# Patient Record
Sex: FEMALE | Race: WHITE | NOT HISPANIC OR LATINO | Employment: FULL TIME | URBAN - METROPOLITAN AREA
[De-identification: names, ages, dates, MRNs, and addresses within clinical notes are randomized per-mention and may not be internally consistent; named-entity substitution may affect disease eponyms.]

---

## 2017-11-09 ENCOUNTER — APPOINTMENT (OUTPATIENT)
Dept: RADIOLOGY | Age: 52
End: 2017-11-09
Payer: COMMERCIAL

## 2017-11-09 ENCOUNTER — TRANSCRIBE ORDERS (OUTPATIENT)
Dept: ADMINISTRATIVE | Age: 52
End: 2017-11-09

## 2017-11-09 DIAGNOSIS — R52 PAIN: Primary | ICD-10-CM

## 2017-11-09 DIAGNOSIS — R52 PAIN: ICD-10-CM

## 2017-11-09 PROCEDURE — 73130 X-RAY EXAM OF HAND: CPT

## 2019-04-04 ENCOUNTER — ANNUAL EXAM (OUTPATIENT)
Dept: OBGYN CLINIC | Facility: CLINIC | Age: 54
End: 2019-04-04
Payer: COMMERCIAL

## 2019-04-04 VITALS
DIASTOLIC BLOOD PRESSURE: 80 MMHG | HEIGHT: 62 IN | WEIGHT: 164 LBS | SYSTOLIC BLOOD PRESSURE: 122 MMHG | BODY MASS INDEX: 30.18 KG/M2

## 2019-04-04 DIAGNOSIS — Z12.39 BREAST SCREENING, UNSPECIFIED: ICD-10-CM

## 2019-04-04 DIAGNOSIS — Z01.419 ROUTINE GYNECOLOGICAL EXAMINATION: ICD-10-CM

## 2019-04-04 DIAGNOSIS — Z01.419 ENCOUNTER FOR WELL WOMAN EXAM: Primary | ICD-10-CM

## 2019-04-04 DIAGNOSIS — Z11.51 SPECIAL SCREENING EXAMINATION FOR HUMAN PAPILLOMAVIRUS (HPV): ICD-10-CM

## 2019-04-04 DIAGNOSIS — N95.1 PERIMENOPAUSAL: ICD-10-CM

## 2019-04-04 PROCEDURE — S0610 ANNUAL GYNECOLOGICAL EXAMINA: HCPCS | Performed by: PHYSICIAN ASSISTANT

## 2019-04-04 RX ORDER — THIAMINE MONONITRATE (VIT B1) 100 MG
100 TABLET ORAL DAILY
COMMUNITY

## 2019-04-04 RX ORDER — RIBOFLAVIN (VITAMIN B2) 100 MG
100 TABLET ORAL DAILY
COMMUNITY

## 2019-04-04 RX ORDER — AMOXICILLIN 875 MG/1
875 TABLET, COATED ORAL 2 TIMES DAILY
COMMUNITY
End: 2019-10-15 | Stop reason: ALTCHOICE

## 2019-04-16 LAB
HPV HR 12 DNA CVX QL NAA+PROBE: NOT DETECTED
HPV16 DNA SPEC QL NAA+PROBE: NOT DETECTED
HPV18 DNA SPEC QL NAA+PROBE: NOT DETECTED
THIN PREP CVX: ABNORMAL

## 2019-10-15 ENCOUNTER — OFFICE VISIT (OUTPATIENT)
Dept: OBGYN CLINIC | Facility: CLINIC | Age: 54
End: 2019-10-15
Payer: COMMERCIAL

## 2019-10-15 VITALS — WEIGHT: 166 LBS | HEIGHT: 61 IN | BODY MASS INDEX: 31.34 KG/M2

## 2019-10-15 DIAGNOSIS — N95.1 VASOMOTOR SYMPTOMS DUE TO MENOPAUSE: ICD-10-CM

## 2019-10-15 DIAGNOSIS — R87.610 ATYPICAL SQUAMOUS CELL CHANGES OF UNDETERMINED SIGNIFICANCE (ASCUS) ON CERVICAL CYTOLOGY WITH NEGATIVE HIGH RISK HUMAN PAPILLOMA VIRUS (HPV) TEST RESULT: Primary | ICD-10-CM

## 2019-10-15 PROCEDURE — 88175 CYTOPATH C/V AUTO FLUID REDO: CPT | Performed by: PHYSICIAN ASSISTANT

## 2019-10-15 PROCEDURE — 99213 OFFICE O/P EST LOW 20 MIN: CPT | Performed by: PHYSICIAN ASSISTANT

## 2019-10-15 RX ORDER — PAROXETINE 7.5 MG/1
7.5 CAPSULE ORAL DAILY
Qty: 30 CAPSULE | Refills: 5 | Status: SHIPPED | OUTPATIENT
Start: 2019-10-15

## 2019-10-15 NOTE — PROGRESS NOTES
The patient is here for a 6 month pap smear  The patient is having hot flashes and sweating  She is also having mood swings  The patient has had these symptoms over a year ago  The symptoms are much worse now

## 2019-10-15 NOTE — PROGRESS NOTES
Assessment/Plan:    No problem-specific Assessment & Plan notes found for this encounter  Diagnoses and all orders for this visit:    Atypical squamous cell changes of undetermined significance (ASCUS) on cervical cytology with negative high risk human papilloma virus (HPV) test result  -     Liquid-based pap, diagnostic    Vasomotor symptoms due to menopause  -     PARoxetine Mesylate 7 5 MG CAPS; Take 1 capsule (7 5 mg total) by mouth daily    Other orders  -     Nutritional Supplements (WOMENS FORMULA MENOPAUSE PO); Take 1 tablet by mouth daily  -     Omega-3 Fatty Acids (FISH OIL PO); Take 1 tablet by mouth daily  -     FLAXSEED, LINSEED, PO; Take by mouth          Subjective:      Patient ID: Graciela Cabrales is a 48 y o  female  Pt here for repeat pap smear  Ascus -hpv in April 2019  C/o vasomotor sxs getting worse--supplements not helping  rx brisdelle today  Repeat pap done      The following portions of the patient's history were reviewed and updated as appropriate: allergies, current medications, past family history, past medical history, past social history, past surgical history and problem list     Review of Systems   Constitutional: Negative for chills, fever and unexpected weight change  Gastrointestinal: Negative for abdominal pain, blood in stool, constipation and diarrhea  Genitourinary: Negative  Objective:      Ht 5' 1 02" (1 55 m)   Wt 75 3 kg (166 lb)   LMP 06/30/2018 (Within Weeks)   BMI 31 34 kg/m²          Physical Exam   Constitutional: She appears well-developed and well-nourished  Genitourinary: Vagina normal  Pelvic exam was performed with patient supine  There is no rash or lesion on the right labia  There is no rash or lesion on the left labia  Cervix exhibits no motion tenderness, no discharge and no friability  Lymphadenopathy: No inguinal adenopathy noted on the right or left side  Nursing note and vitals reviewed

## 2019-10-20 LAB
LAB AP GYN PRIMARY INTERPRETATION: NORMAL
Lab: NORMAL

## 2019-10-21 ENCOUNTER — TELEPHONE (OUTPATIENT)
Dept: OBGYN CLINIC | Facility: CLINIC | Age: 54
End: 2019-10-21

## 2019-10-21 NOTE — TELEPHONE ENCOUNTER
[facility] states Paroxetine 7 5mgs is not covered under patients plan  Did you want to prescribe anything else?   2016 was on Zanax 0 5 q 6hrs prn by PCP

## 2020-04-07 ENCOUNTER — ANNUAL EXAM (OUTPATIENT)
Dept: OBGYN CLINIC | Facility: CLINIC | Age: 55
End: 2020-04-07
Payer: COMMERCIAL

## 2020-04-07 VITALS
DIASTOLIC BLOOD PRESSURE: 70 MMHG | HEIGHT: 61 IN | WEIGHT: 174 LBS | BODY MASS INDEX: 32.85 KG/M2 | SYSTOLIC BLOOD PRESSURE: 130 MMHG

## 2020-04-07 DIAGNOSIS — Z12.39 ENCOUNTER FOR SCREENING BREAST EXAMINATION: ICD-10-CM

## 2020-04-07 DIAGNOSIS — Z01.419 ENCOUNTER FOR WELL WOMAN EXAM: Primary | ICD-10-CM

## 2020-04-07 DIAGNOSIS — Z12.31 ENCOUNTER FOR SCREENING MAMMOGRAM FOR BREAST CANCER: ICD-10-CM

## 2020-04-07 DIAGNOSIS — R87.610 ATYPICAL SQUAMOUS CELL CHANGES OF UNDETERMINED SIGNIFICANCE (ASCUS) ON CERVICAL CYTOLOGY WITH NEGATIVE HIGH RISK HUMAN PAPILLOMA VIRUS (HPV) TEST RESULT: ICD-10-CM

## 2020-04-07 PROCEDURE — 88175 CYTOPATH C/V AUTO FLUID REDO: CPT | Performed by: PHYSICIAN ASSISTANT

## 2020-04-07 PROCEDURE — S0612 ANNUAL GYNECOLOGICAL EXAMINA: HCPCS | Performed by: PHYSICIAN ASSISTANT

## 2020-04-07 RX ORDER — PAROXETINE 10 MG/1
10 TABLET, FILM COATED ORAL DAILY
COMMUNITY
Start: 2020-03-12 | End: 2020-06-08 | Stop reason: SDUPTHER

## 2020-04-09 LAB
LAB AP GYN PRIMARY INTERPRETATION: NORMAL
Lab: NORMAL

## 2020-04-26 ENCOUNTER — AMB VIDEO VISIT (OUTPATIENT)
Dept: OTHER | Facility: HOSPITAL | Age: 55
End: 2020-04-26
Payer: COMMERCIAL

## 2020-04-26 PROCEDURE — 99201 PR OFFICE OUTPATIENT NEW 10 MINUTES: CPT | Performed by: FAMILY MEDICINE

## 2020-06-08 DIAGNOSIS — N95.1 VASOMOTOR SYMPTOMS DUE TO MENOPAUSE: Primary | ICD-10-CM

## 2020-06-08 RX ORDER — PAROXETINE 10 MG/1
10 TABLET, FILM COATED ORAL DAILY
Qty: 90 TABLET | Refills: 1 | Status: SHIPPED | OUTPATIENT
Start: 2020-06-08

## 2023-08-05 ENCOUNTER — HOSPITAL ENCOUNTER (EMERGENCY)
Facility: HOSPITAL | Age: 58
Discharge: HOME | End: 2023-08-05
Attending: EMERGENCY MEDICINE
Payer: COMMERCIAL

## 2023-08-05 VITALS
DIASTOLIC BLOOD PRESSURE: 72 MMHG | SYSTOLIC BLOOD PRESSURE: 153 MMHG | OXYGEN SATURATION: 94 % | RESPIRATION RATE: 18 BRPM | HEART RATE: 63 BPM | TEMPERATURE: 98 F
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
## 2023-08-05 PROCEDURE — 96375 TX/PRO/DX INJ NEW DRUG ADDON: CPT

## 2023-08-05 PROCEDURE — 99284 EMERGENCY DEPT VISIT MOD MDM: CPT | Performed by: PHYSICIAN ASSISTANT

## 2023-08-05 PROCEDURE — 96374 THER/PROPH/DIAG INJ IV PUSH: CPT

## 2023-08-05 PROCEDURE — 99281 EMR DPT VST MAYX REQ PHY/QHP: CPT

## 2023-08-05 PROCEDURE — 96361 HYDRATE IV INFUSION ADD-ON: CPT

## 2023-08-05 RX ORDER — KETOROLAC TROMETHAMINE 30 MG/ML
15 INJECTION, SOLUTION INTRAMUSCULAR; INTRAVENOUS ONCE
Status: COMPLETED | OUTPATIENT
Start: 2023-08-05 | End: 2023-08-05

## 2023-08-05 RX ORDER — FAMOTIDINE 10 MG/ML
20 INJECTION, SOLUTION INTRAVENOUS ONCE
Status: COMPLETED | OUTPATIENT
Start: 2023-08-05 | End: 2023-08-05

## 2023-08-05 RX ORDER — DIPHENHYDRAMINE HYDROCHLORIDE 50 MG/ML
25 INJECTION INTRAMUSCULAR; INTRAVENOUS ONCE
Status: COMPLETED | OUTPATIENT
Start: 2023-08-05 | End: 2023-08-05

## 2023-08-05 RX ORDER — METHYLPREDNISOLONE SODIUM SUCCINATE 125 MG/2ML
80 INJECTION, POWDER, LYOPHILIZED, FOR SOLUTION INTRAMUSCULAR; INTRAVENOUS ONCE
Status: COMPLETED | OUTPATIENT
Start: 2023-08-05 | End: 2023-08-05

## 2023-08-05 RX ORDER — LIDOCAINE HYDROCHLORIDE 20 MG/ML
JELLY TOPICAL ONCE
Status: COMPLETED | OUTPATIENT
Start: 2023-08-05 | End: 2023-08-05

## 2023-08-05 RX ADMIN — FAMOTIDINE 20 MG: 10 INJECTION INTRAVENOUS at 01:56

## 2023-08-05 RX ADMIN — LIDOCAINE HYDROCHLORIDE 1 APPLICATION: 20 JELLY TOPICAL at 02:01

## 2023-08-05 RX ADMIN — Medication 2.5 MG: at 03:26

## 2023-08-05 RX ADMIN — SODIUM CHLORIDE 1000 ML: 0.9 INJECTION, SOLUTION INTRAVENOUS at 01:55

## 2023-08-05 RX ADMIN — DIPHENHYDRAMINE HYDROCHLORIDE 25 MG: 50 INJECTION INTRAMUSCULAR; INTRAVENOUS at 02:01

## 2023-08-05 RX ADMIN — KETOROLAC TROMETHAMINE 15 MG: 30 INJECTION, SOLUTION INTRAMUSCULAR at 03:26

## 2023-08-05 RX ADMIN — Medication 2.5 MG: at 01:51

## 2023-08-05 RX ADMIN — METHYLPREDNISOLONE SODIUM SUCCINATE 80 MG: 125 INJECTION, POWDER, FOR SOLUTION INTRAMUSCULAR; INTRAVENOUS at 01:55

## 2023-08-05 NOTE — ED PROVIDER NOTES
History  Chief Complaint   Patient presents with   • Insect Bite     Pt was stung by multiple wasps on hands and arms yesterday afternoon. [facility]. Pain worsened tonight. Patient is a 68-year-old female with no significant past medical and surgical history presents emerged department with right forearm dull aching persistent pain and swelling for 12 hours. Patient denies associated symptoms. Patient currently has allergies to tetanus toxoid's and influenza virus vaccine. Patient states that while she was gardening outside, "I must of disturbed a wasp nest.",  With patient reporting seeing wasps and feeling "a bunch of stings on my right arm."  Patient states that she went to [facility], and was prescribed prednisone 60 mg to be taken daily for 10 days. States that she had taken 125 mg Benadryl at time of initial sting noted by a wasp. Patient denies palliative factors with provocative factors of pressure to right arm. Patient denies noneffective treatment. Patient denies fevers, chills, nausea, vomiting, diarrhea, constipation and urinary symptoms. Patient has recent fall recent trauma. Patient denies sick contacts recent travel. Patient denies chest pain, shortness of breath, and abdominal pain. History provided by:  Patient   used: No    Insect Bite  Associated symptoms: no fever, no numbness and no rash        Prior to Admission Medications   Prescriptions Last Dose Informant Patient Reported? Taking? ALPRAZolam (XANAX) 0.5 mg tablet   Yes No   Sig: Take 0.5 mg by mouth 3 (three) times a day as needed for anxiety. Ascorbic Acid (VITAMIN C) 100 MG tablet   Yes No   Sig: Take 100 mg by mouth daily   Calcium-Magnesium-Vitamin D (CALCIUM 500 PO)   Yes No   Sig: Take by mouth.    FLAXSEED, LINSEED, PO   Yes No   Sig: Take by mouth   Nutritional Supplements (WOMENS FORMULA MENOPAUSE PO)  Self Yes No   Sig: Take 1 tablet by mouth daily Omega-3 Fatty Acids (FISH OIL PO)  Self Yes No   Sig: Take 1 tablet by mouth daily   PARoxetine (PAXIL) 10 mg tablet   No No   Sig: Take 1 tablet (10 mg total) by mouth daily   PARoxetine Mesylate 7.5 MG CAPS   No No   Sig: Take 1 capsule (7.5 mg total) by mouth daily   Patient not taking: Reported on 2020   Potassium 99 MG TABS   Yes No   Sig: Take 1 tablet by mouth daily. multivitamin (THERAGRAN) TABS   Yes No   Sig: Take 1 tablet by mouth daily. thiamine (VITAMIN B1) 100 mg tablet  Self Yes No   Sig: Take 100 mg by mouth daily      Facility-Administered Medications: None       Past Medical History:   Diagnosis Date   • Dense breasts    • History of prior pregnancies        • Papanicolaou smear 2016    NEG       Past Surgical History:   Procedure Laterality Date   • ADENOIDECTOMY     • BREAST BIOPSY Right 2014    Benign   • BREAST LUMPECTOMY Bilateral    • FISTULA REPAIR     • MAMMO (HISTORICAL) Bilateral 2018    no evidence of malignancy   • TONSILLECTOMY         Family History   Problem Relation Age of Onset   • Cervical cancer Mother    • Diabetes Mother    • Hypertension Mother    • No Known Problems Father    • AVM Sister    • Seizures Sister    • Depression Daughter    • Anorexia nervosa Daughter    • Asthma Daughter    • Mental illness Daughter    • Asthma Daughter    • Allergies Daughter      I have reviewed and agree with the history as documented. E-Cigarette/Vaping     E-Cigarette/Vaping Substances     Social History     Tobacco Use   • Smoking status: Never   • Smokeless tobacco: Never   Substance Use Topics   • Alcohol use: Not Currently     Comment: occasional   • Drug use: No       Review of Systems   Constitutional: Negative for activity change, appetite change, chills and fever. HENT: Negative for congestion, postnasal drip, rhinorrhea, sinus pressure, sinus pain, sore throat and tinnitus. Eyes: Negative for photophobia and visual disturbance. Respiratory: Negative for cough, chest tightness and shortness of breath. Cardiovascular: Negative for chest pain and palpitations. Gastrointestinal: Negative for abdominal pain, constipation, diarrhea, nausea and vomiting. Genitourinary: Negative for difficulty urinating, dysuria, flank pain, frequency and urgency. Musculoskeletal: Positive for arthralgias. Negative for back pain, gait problem, neck pain and neck stiffness. Skin: Negative for pallor and rash. Allergic/Immunologic: Negative for environmental allergies and food allergies. Neurological: Negative for dizziness, weakness, numbness and headaches. Psychiatric/Behavioral: Negative for confusion. All other systems reviewed and are negative. Physical Exam  Physical Exam  Vitals and nursing note reviewed. Constitutional:       General: She is awake. Appearance: Normal appearance. She is well-developed and normal weight. She is not ill-appearing, toxic-appearing or diaphoretic. Comments: BP (!) 181/111 (BP Location: Right arm)   Pulse 72   Temp 98 °F (36.7 °C) (Oral)   Resp 18   LMP 06/30/2018 (Within Weeks)   SpO2 97%      HENT:      Head: Normocephalic and atraumatic. Jaw: There is normal jaw occlusion. Right Ear: Hearing, tympanic membrane and external ear normal. No decreased hearing noted. No drainage, swelling or tenderness. No mastoid tenderness. Left Ear: Hearing, tympanic membrane and external ear normal. No decreased hearing noted. No drainage, swelling or tenderness. No mastoid tenderness. Nose: Nose normal.      Mouth/Throat:      Lips: Pink. Mouth: Mucous membranes are moist.      Pharynx: Oropharynx is clear. Uvula midline. Eyes:      General: Lids are normal. Vision grossly intact. Gaze aligned appropriately. Right eye: No discharge. Left eye: No discharge. Extraocular Movements: Extraocular movements intact.       Conjunctiva/sclera: Conjunctivae normal. Pupils: Pupils are equal, round, and reactive to light. Neck:      Vascular: No JVD. Trachea: Trachea and phonation normal. No tracheal tenderness or tracheal deviation. Cardiovascular:      Rate and Rhythm: Normal rate and regular rhythm. Pulses: Normal pulses. Radial pulses are 2+ on the right side and 2+ on the left side. Posterior tibial pulses are 2+ on the right side and 2+ on the left side. Heart sounds: Normal heart sounds. Pulmonary:      Effort: Pulmonary effort is normal.      Breath sounds: Normal breath sounds and air entry. No stridor. No decreased breath sounds, wheezing, rhonchi or rales. Chest:      Chest wall: No tenderness. Abdominal:      General: Abdomen is flat. Bowel sounds are normal. There is no distension. Palpations: Abdomen is soft. Abdomen is not rigid. Tenderness: There is no abdominal tenderness. There is no guarding or rebound. Musculoskeletal:         General: Normal range of motion. Right forearm: Swelling and tenderness present. Cervical back: Full passive range of motion without pain, normal range of motion and neck supple. No rigidity. No spinous process tenderness or muscular tenderness. Normal range of motion. Comments: Passive ROM intact  Upper and lower extremity 5/5 bilaterally  Neurovascularly intact  No grinding or clicking of joints     Feet:      Right foot:      Toenail Condition: Right toenails are normal.      Left foot:      Toenail Condition: Left toenails are normal.   Lymphadenopathy:      Head:      Right side of head: No submental, submandibular, tonsillar, preauricular, posterior auricular or occipital adenopathy. Left side of head: No submental, submandibular, tonsillar, preauricular, posterior auricular or occipital adenopathy. Cervical: No cervical adenopathy. Right cervical: No superficial, deep or posterior cervical adenopathy.      Left cervical: No superficial, deep or posterior cervical adenopathy. Skin:     General: Skin is warm. Capillary Refill: Capillary refill takes less than 2 seconds. Neurological:      General: No focal deficit present. Mental Status: She is alert and oriented to person, place, and time. Mental status is at baseline. GCS: GCS eye subscore is 4. GCS verbal subscore is 5. GCS motor subscore is 6. Sensory: No sensory deficit. Psychiatric:         Attention and Perception: Attention normal.         Mood and Affect: Mood normal.         Speech: Speech normal.         Behavior: Behavior normal. Behavior is cooperative. Thought Content:  Thought content normal.         Judgment: Judgment normal.         Vital Signs  ED Triage Vitals   Temperature Pulse Respirations Blood Pressure SpO2   08/05/23 0126 08/05/23 0123 08/05/23 0123 08/05/23 0123 08/05/23 0123   98 °F (36.7 °C) 72 18 (!) 181/111 97 %      Temp Source Heart Rate Source Patient Position - Orthostatic VS BP Location FiO2 (%)   08/05/23 0126 08/05/23 0123 -- 08/05/23 0123 --   Oral Monitor  Right arm       Pain Score       08/05/23 0151       8           Vitals:    08/05/23 0123 08/05/23 0230   BP: (!) 181/111 153/72   Pulse: 72 63         Visual Acuity      ED Medications  Medications   oxyCODONE (ROXICODONE) split tablet 2.5 mg (2.5 mg Oral Given 8/5/23 0151)   sodium chloride 0.9 % bolus 1,000 mL (0 mL Intravenous Stopped 8/5/23 0329)   Famotidine (PF) (PEPCID) injection 20 mg (20 mg Intravenous Given 8/5/23 0156)   diphenhydrAMINE (BENADRYL) injection 25 mg (25 mg Intravenous Given 8/5/23 0201)   methylPREDNISolone sodium succinate (Solu-MEDROL) injection 80 mg (80 mg Intravenous Given 8/5/23 0155)   lidocaine (URO-JET) 2 % urethral/mucosal gel (1 Application Topical Given 8/5/23 0201)   ketorolac (TORADOL) injection 15 mg (15 mg Intravenous Given 8/5/23 0326)   oxyCODONE (ROXICODONE) split tablet 2.5 mg (2.5 mg Oral Given 8/5/23 0326)       Diagnostic Studies  Results Reviewed     None                 No orders to display              Procedures  Procedures         ED Course  ED Course as of 08/05/23 0728   Sat Aug 05, 2023   1982 Reevaluation of patient with patient speaking in full sentences, no stridor, no nausea vomiting, no wheezing or other adventitious breath sounds. No anaphylaxis                                             Medical Decision Making  Patient is a 63-year-old female with no significant past medical and surgical history presents emerged department with right forearm dull aching persistent pain and swelling for 12 hours. Patient hemodynamically stable and afebrile  No sirs  No nausea, no vomiting, no adventitious breath sounds on exam, doubt anaphylaxis, patient has no stridor, no tongue protrusion, no angioedema  Delivered Roxicodone, Pepcid, Benadryl, Solu-Medrol, 1 L bolus of normal saline solution, and topical lidocaine in the emergency department; patient demonstrates decrease in presenting left forearm and left hand pain ED symptomatology status post medication delivery  No evidence of infection at site of wasp stings  Patient stated that she has treated for allergic reaction from urgent care;  [facility] with 60 mg prednisone daily 10 days  Counseled patient on using Benadryl at home, 25 mg every 6 hours as needed; and also to titrate the prednisone from 60 to 40 mg for the next 4 to 5 days and reevaluate her right forearm and right hand pain status post bee stings with her PCP  Follow-up with general allergist  Follow-up with PCP  Follow up with emergency department if symptoms persist or exacerbate  Patient demonstrates verbal understanding of all clinical laboratory and imaging findings, discharge instructions, follow-up, and verbally agrees with current treatment plan with teach back    *Due to voice recognition software, sound alike and misspelled words my be contained in the documentation*      Risk  Prescription drug management. Disposition  Final diagnoses:   Multiple insect bites - right arm     Time reflects when diagnosis was documented in both MDM as applicable and the Disposition within this note     Time User Action Codes Description Comment    8/5/2023  3:19 AM Atul Im Add August.Stamp. XXXA] Multiple insect bites     8/5/2023  3:19 AM Atul Im Modify [R59. XXXA] Multiple insect bites right arm      ED Disposition     ED Disposition   Discharge    Condition   Stable    Date/Time   Sat Aug 5, 2023  3:18 AM    Comment   [place] discharge to home/self care. Follow-up Information     Follow up With Specialties Details Why Contact Info Additional Information    Tae Scanlon DO Family Medicine   [address] Emergency Department Emergency Medicine   [address] [facility], [facility], [address] [place] [address] [address] Floor, [address] Pediatric Allergy [facility]   [address] [facility] [address]  [address] [facility], [address] [place] [address], [id number], [phone]          Discharge Medication List as of 8/5/2023  3:20 AM      CONTINUE these medications which have NOT CHANGED    Details   ALPRAZolam (XANAX) 0.5 mg tablet Take 0.5 mg by mouth 3 (three) times a day as needed for anxiety. , Until Discontinued, Historical Med      Ascorbic Acid (VITAMIN C) 100 MG tablet Take 100 mg by mouth daily, Historical Med      Calcium-Magnesium-Vitamin D (CALCIUM 500 PO) Take by mouth., Until Discontinued, Historical Med      FLAXSEED, LINSEED, PO Take by mouth, Historical Med      multivitamin (THERAGRAN) TABS Take 1 tablet by mouth daily. , Until Discontinued, Historical Med      Nutritional Supplements (WOMENS FORMULA MENOPAUSE PO) Take 1 tablet by mouth daily, Historical Med      Omega-3 Fatty Acids (FISH OIL PO) Take 1 tablet by mouth daily, Historical Med      PARoxetine (PAXIL) 10 mg tablet Take 1 tablet (10 mg total) by mouth daily, Starting Mon 6/8/2020, Normal      PARoxetine Mesylate 7.5 MG CAPS Take 1 capsule (7.5 mg total) by mouth daily, Starting Tue 10/15/2019, Normal      Potassium 99 MG TABS Take 1 tablet by mouth daily. , Until Discontinued, Historical Med      thiamine (VITAMIN B1) 100 mg tablet Take 100 mg by mouth daily, Historical Med             No discharge procedures on file.     PDMP Review     None          ED Provider  Electronically Signed by           Richard Chery PA-C  08/05/23 [id number]

## 2024-10-02 ENCOUNTER — APPOINTMENT (EMERGENCY)
Dept: RADIOLOGY | Facility: HOSPITAL | Age: 59
End: 2024-10-02
Payer: COMMERCIAL

## 2024-10-02 ENCOUNTER — HOSPITAL ENCOUNTER (EMERGENCY)
Facility: HOSPITAL | Age: 59
Discharge: HOME | End: 2024-10-02
Attending: EMERGENCY MEDICINE
Payer: COMMERCIAL

## 2024-10-02 VITALS
SYSTOLIC BLOOD PRESSURE: 173 MMHG | DIASTOLIC BLOOD PRESSURE: 86 MMHG | BODY MASS INDEX: 33.71 KG/M2 | OXYGEN SATURATION: 99 % | RESPIRATION RATE: 22 BRPM | WEIGHT: 178.57 LBS | HEART RATE: 69 BPM | TEMPERATURE: 97.7 F

## 2024-10-02 DIAGNOSIS — M25.562 ACUTE PAIN OF LEFT KNEE: Primary | ICD-10-CM

## 2024-10-02 PROCEDURE — 99284 EMERGENCY DEPT VISIT MOD MDM: CPT | Performed by: EMERGENCY MEDICINE

## 2024-10-02 PROCEDURE — 99283 EMERGENCY DEPT VISIT LOW MDM: CPT

## 2024-10-02 PROCEDURE — 73564 X-RAY EXAM KNEE 4 OR MORE: CPT

## 2024-10-02 RX ORDER — NAPROXEN 250 MG/1
500 TABLET ORAL ONCE
Status: DISCONTINUED | OUTPATIENT
Start: 2024-10-02 | End: 2024-10-02 | Stop reason: HOSPADM

## 2024-10-02 RX ORDER — OXYCODONE HYDROCHLORIDE 5 MG/1
5 TABLET ORAL ONCE
Status: COMPLETED | OUTPATIENT
Start: 2024-10-02 | End: 2024-10-02

## 2024-10-02 RX ORDER — LIDOCAINE 50 MG/G
1 PATCH TOPICAL ONCE
Status: DISCONTINUED | OUTPATIENT
Start: 2024-10-02 | End: 2024-10-02 | Stop reason: HOSPADM

## 2024-10-02 RX ORDER — OXYCODONE HYDROCHLORIDE 5 MG/1
5 TABLET ORAL 3 TIMES DAILY PRN
Qty: 9 TABLET | Refills: 0 | Status: SHIPPED | OUTPATIENT
Start: 2024-10-02 | End: 2024-10-11

## 2024-10-02 RX ORDER — ACETAMINOPHEN 325 MG/1
975 TABLET ORAL ONCE
Status: COMPLETED | OUTPATIENT
Start: 2024-10-02 | End: 2024-10-02

## 2024-10-02 RX ADMIN — OXYCODONE HYDROCHLORIDE 5 MG: 5 TABLET ORAL at 14:39

## 2024-10-02 RX ADMIN — ACETAMINOPHEN 975 MG: 325 TABLET ORAL at 14:38

## 2024-10-02 RX ADMIN — LIDOCAINE 1 PATCH: 700 PATCH TOPICAL at 14:39

## 2024-10-02 NOTE — ED PROVIDER NOTES
Final diagnoses:   Acute pain of left knee     ED Disposition       ED Disposition   Discharge    Condition   Stable    Date/Time   Wed Oct 2, 2024  3:49 PM    Comment   Pricilla Norman discharge to home/self care.                   Assessment & Plan       Medical Decision Making  58-year-old female presents with left knee pain, acute on chronic.  Extremities neurovascularly intact.  Tenderness and laxity at LCL of the left knee.  X-ray to rule out fracture, no bony abnormalities noted.  Multimodal pain management with naproxen, Tylenol, oxycodone, Lidoderm patch  Leg brace and crutches.  Referral to physical therapy  Referral to orthopedics  Discussed multimodal pain management and Fung therapy.  Advised to follow-up with orthopedics as she will likely need continued physical therapy and repeat evaluation.      Amount and/or Complexity of Data Reviewed  Radiology: ordered and independent interpretation performed.    Risk  OTC drugs.  Prescription drug management.             Medications   acetaminophen (TYLENOL) tablet 975 mg (975 mg Oral Given 10/2/24 1438)   oxyCODONE (ROXICODONE) IR tablet 5 mg (5 mg Oral Given 10/2/24 1439)       ED Risk Strat Scores                                               History of Present Illness       Chief Complaint   Patient presents with    Knee Pain     Pt c/o chronic left knee pain, worse today after moving heavy furniture. Unable to bear weight.        Past Medical History:   Diagnosis Date    Dense breasts     History of prior pregnancies         Hypertension     Papanicolaou smear 2016    NEG      Past Surgical History:   Procedure Laterality Date    ADENOIDECTOMY      BREAST BIOPSY Right 2014    Benign    BREAST LUMPECTOMY Bilateral     FISTULA REPAIR  1993    MAMMO (HISTORICAL) Bilateral 2018    no evidence of malignancy    TONSILLECTOMY        Family History   Problem Relation Age of Onset    Cervical cancer Mother     Diabetes Mother      Hypertension Mother     No Known Problems Father     AVM Sister     Seizures Sister     Depression Daughter     Anorexia nervosa Daughter     Asthma Daughter     Mental illness Daughter     Asthma Daughter     Allergies Daughter       Social History     Tobacco Use    Smoking status: Never    Smokeless tobacco: Never   Substance Use Topics    Alcohol use: Not Currently     Comment: occasional    Drug use: No      E-Cigarette/Vaping      E-Cigarette/Vaping Substances      I have reviewed and agree with the history as documented.     58-year-old female with no significant past medical history presents with left knee pain.  Patient states that she has had chronic left knee pain that has been worsening over the past couple months as she has been moving to another residence.  She states during the moving process she had been lifting heavy furniture and overexerting herself with progressive worsening of pain in the left knee and then yesterday had episode of stumbling gait as her knee gave out.  Denies any fall or trauma but states worsening left lateral knee pain with pain on flexion of the knee.  Has been attempting naproxen and ibuprofen without relief.  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  Denies fevers, chills, numbness, paresthesias, hip injury, decreased range of motion of the hip, decreased range of motion of the ankle, discoloration.      Knee Pain      Review of Systems   All other systems reviewed and are negative.          Objective       ED Triage Vitals [10/02/24 1333]   Temperature Pulse Blood Pressure Respirations SpO2 Patient Position - Orthostatic VS   97.7 °F (36.5 °C) 69 (!) 173/86 22 99 % --      Temp Source Heart Rate Source BP Location FiO2 (%) Pain Score    Oral Monitor Right arm -- 8      Vitals      Date and Time Temp Pulse SpO2 Resp BP Pain Score FACES Pain Rating User   10/02/24 1438 -- -- -- -- -- 8 -- LD   10/02/24 1333 97.7 °F (36.5 °C) 69 99 % 22 173/86 8 --             Physical  Exam  Vitals and nursing note reviewed.   Constitutional:       General: She is not in acute distress.     Appearance: Normal appearance. She is normal weight. She is not ill-appearing, toxic-appearing or diaphoretic.   HENT:      Head: Normocephalic and atraumatic.      Right Ear: External ear normal.      Left Ear: External ear normal.      Nose: Nose normal.      Mouth/Throat:      Mouth: Mucous membranes are moist.      Pharynx: Oropharynx is clear.   Eyes:      General: No scleral icterus.        Right eye: No discharge.         Left eye: No discharge.      Extraocular Movements: Extraocular movements intact.   Neck:      Comments: No midline C/T/L/S spine tenderness, step-offs, deformities.  Full range of motion of the cervical spine without tenderness.    Cardiovascular:      Rate and Rhythm: Normal rate and regular rhythm.      Pulses: Normal pulses.      Heart sounds: Normal heart sounds. No murmur heard.  Pulmonary:      Effort: Pulmonary effort is normal. No respiratory distress.      Breath sounds: Normal breath sounds. No stridor. No wheezing, rhonchi or rales.   Chest:      Chest wall: No tenderness.   Abdominal:      General: There is no distension.      Palpations: Abdomen is soft. There is no mass.      Tenderness: There is no abdominal tenderness. There is no guarding or rebound.      Hernia: No hernia is present.   Musculoskeletal:         General: Tenderness present. No swelling, deformity or signs of injury.      Cervical back: Normal range of motion and neck supple. No rigidity or tenderness.      Right lower leg: No edema.      Left lower leg: No edema.      Comments: Compartments of the lower extremities are soft and nontender.  DP/PT pulses 2+ bilaterally.  Full range of motion at the hip and ankle.  Sensation to light touch intact over distal extremities.   Min negative  Anterior and posterior drawer negative.  Slight amount of laxity at left lateral collateral ligament.  Medial  collateral ligament intact.  Tenderness over LCL.   Skin:     General: Skin is warm and dry.      Capillary Refill: Capillary refill takes less than 2 seconds.      Coloration: Skin is not cyanotic, jaundiced or pale.      Findings: No bruising, erythema, lesion, petechiae or rash.   Neurological:      General: No focal deficit present.      Mental Status: She is alert and oriented to person, place, and time. Mental status is at baseline.         Results Reviewed       None            XR knee 4+ views left injury   ED Interpretation by Kalli Hidalgo MD (10/02 1500)   No acute bony abnormalities.      Final Interpretation by Marisa Singh MD (10/02 1526)      No acute osseous abnormality.         Computerized Assisted Algorithm (CAA) may have been used to analyze all applicable images.         Workstation performed: [id number]             Procedures    ED Medication and Procedure Management   Prior to Admission Medications   Prescriptions Last Dose Informant Patient Reported? Taking?   ALPRAZolam (XANAX) 0.5 mg tablet   Yes No   Sig: Take 0.5 mg by mouth 3 (three) times a day as needed for anxiety.   Ascorbic Acid (VITAMIN C) 100 MG tablet   Yes No   Sig: Take 100 mg by mouth daily   Calcium-Magnesium-Vitamin D (CALCIUM 500 PO)   Yes No   Sig: Take by mouth.   FLAXSEED, LINSEED, PO   Yes No   Sig: Take by mouth   Nutritional Supplements (WOMENS FORMULA MENOPAUSE PO)  Self Yes No   Sig: Take 1 tablet by mouth daily   Omega-3 Fatty Acids (FISH OIL PO)  Self Yes No   Sig: Take 1 tablet by mouth daily   PARoxetine (PAXIL) 10 mg tablet   No No   Sig: Take 1 tablet (10 mg total) by mouth daily   PARoxetine Mesylate 7.5 MG CAPS   No No   Sig: Take 1 capsule (7.5 mg total) by mouth daily   Patient not taking: Reported on 4/7/2020   Potassium 99 MG TABS   Yes No   Sig: Take 1 tablet by mouth daily.   multivitamin (THERAGRAN) TABS   Yes No   Sig: Take 1 tablet by mouth daily.   thiamine (VITAMIN B1) 100 mg tablet  Self Yes  No   Sig: Take 100 mg by mouth daily      Facility-Administered Medications: None     Discharge Medication List as of 10/2/2024  3:52 PM        START taking these medications    Details   oxyCODONE (Roxicodone) 5 immediate release tablet Take 1 tablet (5 mg total) by mouth 3 (three) times a day as needed for moderate pain Max Daily Amount: 15 mg, Starting Wed 10/2/2024, Normal           CONTINUE these medications which have NOT CHANGED    Details   ALPRAZolam (XANAX) 0.5 mg tablet Take 0.5 mg by mouth 3 (three) times a day as needed for anxiety., Until Discontinued, Historical Med      Ascorbic Acid (VITAMIN C) 100 MG tablet Take 100 mg by mouth daily, Historical Med      Calcium-Magnesium-Vitamin D (CALCIUM 500 PO) Take by mouth., Until Discontinued, Historical Med      FLAXSEED, LINSEED, PO Take by mouth, Historical Med      multivitamin (THERAGRAN) TABS Take 1 tablet by mouth daily., Until Discontinued, Historical Med      Nutritional Supplements (WOMENS FORMULA MENOPAUSE PO) Take 1 tablet by mouth daily, Historical Med      Omega-3 Fatty Acids (FISH OIL PO) Take 1 tablet by mouth daily, Historical Med      PARoxetine (PAXIL) 10 mg tablet Take 1 tablet (10 mg total) by mouth daily, Starting Mon 6/8/2020, Normal      PARoxetine Mesylate 7.5 MG CAPS Take 1 capsule (7.5 mg total) by mouth daily, Starting Tue 10/15/2019, Normal      Potassium 99 MG TABS Take 1 tablet by mouth daily., Until Discontinued, Historical Med      thiamine (VITAMIN B1) 100 mg tablet Take 100 mg by mouth daily, Historical Med             ED SEPSIS DOCUMENTATION   Time reflects when diagnosis was documented in both MDM as applicable and the Disposition within this note       Time User Action Codes Description Comment    10/2/2024  3:49 PM Kalli Hidalgo Add [M25.562] Acute pain of left knee                  Kalli Hidalgo MD  10/02/24 6570

## 2024-10-02 NOTE — Clinical Note
Pricilla Norman was seen and treated in our emergency department on 10/2/2024.                Diagnosis:     Pricilla Kay  .    She may return on this date: 10/09/2024         If you have any questions or concerns, please don't hesitate to call.      Kalli Hidalgo MD    ______________________________           _______________          _______________  Hospital Representative                              Date                                Time

## 2024-10-02 NOTE — ED ATTENDING ATTESTATION
10/2/2024  IJose DO, saw and evaluated the patient. I have discussed the patient with the resident/non-physician practitioner and agree with the resident's/non-physician practitioner's findings, Plan of Care, and MDM as documented in the resident's/non-physician practitioner's note, except where noted. All available labs and Radiology studies were reviewed.  I was present for key portions of any procedure(s) performed by the resident/non-physician practitioner and I was immediately available to provide assistance.       At this point I agree with the current assessment done in the Emergency Department.  I have conducted an independent evaluation of this patient a history and physical is as follows:    Patient is a 58-year-old female who presents with left knee pain.  She states that she has had left knee pain for many years.  It has been getting worse over the past several months.  About 4 weeks ago, she noticed that it was significantly worse than her baseline.  He has been moving houses and lifting furniture.  She has also been taking care of her daughter and granddaughter.  She states that she has to go up and down the steps multiple times a day.  Yesterday her pain became more significant.  She started using her daughter's crutches as she was not able to bear weight.  She has been taking NSAIDs without significant relief.  She denies any specific trauma.    On exam, patient is in no acute distress.  Heart is regular rate and rhythm.  Breath sounds normal.  Abdomen is soft, nontender, nondistended.  Left knee without effusion or gross deformity.  Tenderness to palpation along the lateral knee.  Extensor mechanism intact.  Distal extremity is warm and well-perfused.  Motor, sensation normal.    Suspect osteoarthritis. Recommend supportive care and outpatient follow up. Weight bearing as tolerating. Patient expresses understanding of discharge instructions including follow up and return precautions.  "    Portions of the above record have been created with voice recognition software.  Occasional wrong word or \"sound alike\" substitutions may have occurred due to the inherent limitations of voice recognition software.  Read the chart carefully and recognize, using context, where substitutions may have occurred.      ED Course         Critical Care Time  Procedures      "

## 2024-10-04 ENCOUNTER — HOSPITAL ENCOUNTER (OUTPATIENT)
Dept: MRI IMAGING | Facility: HOSPITAL | Age: 59
End: 2024-10-04
Payer: COMMERCIAL

## 2024-10-04 ENCOUNTER — OFFICE VISIT (OUTPATIENT)
Dept: OBGYN CLINIC | Facility: CLINIC | Age: 59
End: 2024-10-04
Payer: COMMERCIAL

## 2024-10-04 VITALS — BODY MASS INDEX: 33.71 KG/M2 | WEIGHT: 178.57 LBS | HEIGHT: 61 IN

## 2024-10-04 DIAGNOSIS — M23.92 INTERNAL DERANGEMENT OF LEFT KNEE: ICD-10-CM

## 2024-10-04 DIAGNOSIS — M25.562 ACUTE PAIN OF LEFT KNEE: ICD-10-CM

## 2024-10-04 DIAGNOSIS — M23.92 INTERNAL DERANGEMENT OF LEFT KNEE: Primary | ICD-10-CM

## 2024-10-04 PROCEDURE — 99244 OFF/OP CNSLTJ NEW/EST MOD 40: CPT | Performed by: STUDENT IN AN ORGANIZED HEALTH CARE EDUCATION/TRAINING PROGRAM

## 2024-10-04 PROCEDURE — 73721 MRI JNT OF LWR EXTRE W/O DYE: CPT

## 2024-10-04 NOTE — PROGRESS NOTES
Date: 10/04/24  Pricilla Norman   MRN# [id number]  : 1965      Chief Complaint: Left Knee Pain    Assessment and Plan:  The patient verbalized understanding of exam findings and treatment plan. We engaged in the shared decision-making process and treatment options were discussed at length with the patient. Surgical and conservative management discussed today along with risks and benefits. Patient was agreeable with the plan and all questions were answered to satisfaction.     Internal derangement of left knee  Patient complains of acute onset left knee pain on 10/1/24. No specific traumatic injury. She was seen in the ED on 10/2 and obtained xrays which did not show any acute fracture or osseous injuries. On exam today, her pain is out of proportion to the xray findings. She is exquisitely tender to palpation over the lateral joint line and anterior lateral knee.   - STAT MRI left knee ordered to further evaluate or ligamentous and/or meniscal injury  -WBAT  -Activity modification to limit strain or impact on the joint  -NSAIDs as needed  -Tylenol 1000mg up to three times daily as needed. Do not exceed 3000mg daily  -Knee sleeve or brace for comfort  -Cane or walker or crutches recommended to offload joint  -Patient may follow up for MRI results.       Subjective:     Knee Pain  Patient presents to the clinic today with complaints of left knee pain. This is evaluated as a personal injury. Patient describes acute onset of left knee symptoms of Tuesday 10/1 and was seen in the ED on 10/2. She was discharged and obtained xrays without signs of acute fracture. Since then, she continues to be unable to bear weight to the left knee and has significant pain with deep flexion and knee extension. The pain began a few days ago. The pain is located lateral, anterior lateral quadriceps and rates their pain as 7/10. She describes the symptoms as aching and sharp. Symptoms improve with rest. The symptoms are worse  "with activity. The knee has given out or felt unstable. The patient cannot bend or straighten the knee fully. Treatment to date has been NWB, with mild relief.    Prior treatment:  NSAIDs Yes    Bracing Yes   Physical Therapy No   Cortisone Injections No   Viscosupplementation No       External Records Reviewed:   ER records and radiology reports    Orthopedic PMH  None    Orthopedic Surgical History  None    Estimated body mass index is 33.72 kg/m² as calculated from the following:    Height as of this encounter: 5' 1.02\" (1.55 m).    Weight as of this encounter: 81 kg (178 lb 9.2 oz).    No results found for: \"HGBA1C\".   Lab Results   Component Value Date    EGFR >60.0 2016    CREATININE 0.83 2016        Allergy:  Allergies   Allergen Reactions    Tetanus Toxoids Anaphylaxis    Influenza Virus Vaccine      Medications:  All current active meds have been reviewed   Past Medical History:  Past Medical History:   Diagnosis Date    Dense breasts     History of prior pregnancies         Hypertension     Papanicolaou smear 2016    NEG     Past Surgical History:  Past Surgical History:   Procedure Laterality Date    ADENOIDECTOMY      BREAST BIOPSY Right 2014    Benign    BREAST LUMPECTOMY Bilateral     FISTULA REPAIR  1993    MAMMO (HISTORICAL) Bilateral 2018    no evidence of malignancy    TONSILLECTOMY       Family History:  Family History   Problem Relation Age of Onset    Cervical cancer Mother     Diabetes Mother     Hypertension Mother     No Known Problems Father     AVM Sister     Seizures Sister     Depression Daughter     Anorexia nervosa Daughter     Asthma Daughter     Mental illness Daughter     Asthma Daughter     Allergies Daughter      Social History:  Social History     Substance and Sexual Activity   Alcohol Use Not Currently    Comment: occasional     Social History     Substance and Sexual Activity   Drug Use No     Social History     Tobacco Use   Smoking " "Status Never   Smokeless Tobacco Never           Review of Systems:  General- denies fever/chills  HEENT- denies hearing loss or sore throat  Eyes- denies eye pain or visual disturbances, denies red eyes  Respiratory- denies cough or SOB  Cardio- denies chest pain or palpitations  GI- denies abdominal pain  Endocrine- denies urinary frequency  Urinary- denies pain with urination  Musculoskeletal- Negative except noted above  Skin- denies rashes or wounds  Neurological- denies dizziness or headache  Psychiatric- denies anxiety or difficulty concentrating      Objective:   BP Readings from Last 1 Encounters:   10/02/24 (!) 173/86      Wt Readings from Last 1 Encounters:   10/04/24 81 kg (178 lb 9.2 oz)      Pulse Readings from Last 1 Encounters:   10/02/24 69        BMI: Estimated body mass index is 33.72 kg/m² as calculated from the following:    Height as of this encounter: 5' 1.02\" (1.55 m).    Weight as of this encounter: 81 kg (178 lb 9.2 oz).      Physical Exam  Ht 5' 1.02\" (1.55 m)   Wt 81 kg (178 lb 9.2 oz)   LMP 06/30/2018 (Within Weeks)   BMI 33.72 kg/m²   General/Constitutional: No apparent distress: well-nourished and well developed.  Eyes: normal ocular motion  Cardio: RRR, Normal S1S2, No m/r/g.   Lymphatic: No appreciable lymphadenopathy  Respiratory: Non-labored breathing, CTA b/l no w/c/r  Vascular: No edema, swelling or tenderness, except as noted in detailed exam. Extremities well perfused. No LE edema  Integumentary: No impressive skin lesions present, except as noted in detailed exam.  Neuro: No ataxia or tremors noted  Psych: Normal mood and affect, oriented to person, place and time. Appropriate affect.  Musculoskeletal: Normal, except as noted in detailed exam and in HPI.    Gait and Station:   normal and antalgic    Left Knee Exam:      Inspection:  normal color, temperature, turgor and moisture    Overall limb alignment: neutral    Effusion: minimal    ROM 5 to 80    Extensor Lag: Absent   "  Palpation: Lateral joint line tenderness to palpation, lateral anterior knee pain  Unable to tolerate Pradip's or Thessaly    Motor: 5/5 EHL/FHL/TA/GS/Qd/Hs    Vascular: Toes WWP with BCR    Sensory: SILT DP/SP/Harshad/Saph/Ti  Distal extremity warm and well perfused       Images:  I personally reviewed relevant images in the PACS system and my interpretation is as follows:    X-rays of the left knee: mild joint space narrowing, subchondral sclerosis, subchondral cysts, osteophyte formation. No acute fracture or dislocation.       Scribe Attestation      I,:  Umesh Caceres PA-C am acting as a scribe while in the presence of the attending physician.:       I,:  Keaton Collado MD personally performed the services described in this documentation    as scribed in my presence.:             Keaton Collado MD  Adult Reconstruction Specialist   [facility]

## 2024-10-04 NOTE — ASSESSMENT & PLAN NOTE
Patient complains of acute onset left knee pain on 10/1/24. No specific traumatic injury. She was seen in the ED on 10/2 and obtained xrays which did not show any acute fracture or osseous injuries. On exam today, her pain is out of proportion to the xray findings. She is exquisitely tender to palpation over the lateral joint line and anterior lateral knee.   - STAT MRI left knee ordered to further evaluate or ligamentous and/or meniscal injury  -WBAT  -Activity modification to limit strain or impact on the joint  -NSAIDs as needed  -Tylenol 1000mg up to three times daily as needed. Do not exceed 3000mg daily  -Knee sleeve or brace for comfort  -Cane or walker or crutches recommended to offload joint  -Patient may follow up for MRI results.

## 2024-10-04 NOTE — LETTER
2024     Jose Chicas DO  [address] [facility] [address]    Patient: Pricilla Norman   YOB: 1965   Date of Visit: 10/4/2024       Dear Dr. Chicas:    Thank you for referring Pricilla Norman to me for evaluation. Below are my notes for this consultation.    If you have questions, please do not hesitate to call me. I look forward to following your patient along with you.         Sincerely,        Keaton Collado MD        CC: No Recipients    Keaton Collado MD  10/4/2024  1:15 PM  Signed        Date: 10/04/24  Pricilla Norman   MRN# [id number]  : 1965      Chief Complaint: Left Knee Pain    Assessment and Plan:  The patient verbalized understanding of exam findings and treatment plan. We engaged in the shared decision-making process and treatment options were discussed at length with the patient. Surgical and conservative management discussed today along with risks and benefits. Patient was agreeable with the plan and all questions were answered to satisfaction.     Internal derangement of left knee  Patient complains of acute onset left knee pain on 10/1/24. No specific traumatic injury. She was seen in the ED on 10/2 and obtained xrays which did not show any acute fracture or osseous injuries. On exam today, her pain is out of proportion to the xray findings. She is exquisitely tender to palpation over the lateral joint line and anterior lateral knee.   - STAT MRI left knee ordered to further evaluate or ligamentous and/or meniscal injury  -WBAT  -Activity modification to limit strain or impact on the joint  -NSAIDs as needed  -Tylenol 1000mg up to three times daily as needed. Do not exceed 3000mg daily  -Knee sleeve or brace for comfort  -Cane or walker or crutches recommended to offload joint  -Patient may follow up for MRI results.       Subjective:     Knee Pain  Patient presents to the clinic today with complaints of left knee pain. This is evaluated as  "a personal injury. Patient describes acute onset of left knee symptoms of Tuesday 10/1 and was seen in the ED on 10/2. She was discharged and obtained xrays without signs of acute fracture. Since then, she continues to be unable to bear weight to the left knee and has significant pain with deep flexion and knee extension. The pain began a few days ago. The pain is located lateral, anterior lateral quadriceps and rates their pain as 7/10. She describes the symptoms as aching and sharp. Symptoms improve with rest. The symptoms are worse with activity. The knee has given out or felt unstable. The patient cannot bend or straighten the knee fully. Treatment to date has been NWB, with mild relief.    Prior treatment:  NSAIDs Yes    Bracing Yes   Physical Therapy No   Cortisone Injections No   Viscosupplementation No       External Records Reviewed:   ER records and radiology reports    Orthopedic PMH  None    Orthopedic Surgical History  None    Estimated body mass index is 33.72 kg/m² as calculated from the following:    Height as of this encounter: 5' 1.02\" (1.55 m).    Weight as of this encounter: 81 kg (178 lb 9.2 oz).    No results found for: \"HGBA1C\".   Lab Results   Component Value Date    EGFR >60.0 2016    CREATININE 0.83 2016        Allergy:  Allergies   Allergen Reactions   • Tetanus Toxoids Anaphylaxis   • Influenza Virus Vaccine      Medications:  All current active meds have been reviewed   Past Medical History:  Past Medical History:   Diagnosis Date   • Dense breasts    • History of prior pregnancies        • Hypertension    • Papanicolaou smear 2016    NEG     Past Surgical History:  Past Surgical History:   Procedure Laterality Date   • ADENOIDECTOMY     • BREAST BIOPSY Right 2014    Benign   • BREAST LUMPECTOMY Bilateral    • FISTULA REPAIR     • MAMMO (HISTORICAL) Bilateral 2018    no evidence of malignancy   • TONSILLECTOMY       Family History:  Family " "History   Problem Relation Age of Onset   • Cervical cancer Mother    • Diabetes Mother    • Hypertension Mother    • No Known Problems Father    • AVM Sister    • Seizures Sister    • Depression Daughter    • Anorexia nervosa Daughter    • Asthma Daughter    • Mental illness Daughter    • Asthma Daughter    • Allergies Daughter      Social History:  Social History     Substance and Sexual Activity   Alcohol Use Not Currently    Comment: occasional     Social History     Substance and Sexual Activity   Drug Use No     Social History     Tobacco Use   Smoking Status Never   Smokeless Tobacco Never           Review of Systems:  General- denies fever/chills  HEENT- denies hearing loss or sore throat  Eyes- denies eye pain or visual disturbances, denies red eyes  Respiratory- denies cough or SOB  Cardio- denies chest pain or palpitations  GI- denies abdominal pain  Endocrine- denies urinary frequency  Urinary- denies pain with urination  Musculoskeletal- Negative except noted above  Skin- denies rashes or wounds  Neurological- denies dizziness or headache  Psychiatric- denies anxiety or difficulty concentrating      Objective:   BP Readings from Last 1 Encounters:   10/02/24 (!) 173/86      Wt Readings from Last 1 Encounters:   10/04/24 81 kg (178 lb 9.2 oz)      Pulse Readings from Last 1 Encounters:   10/02/24 69        BMI: Estimated body mass index is 33.72 kg/m² as calculated from the following:    Height as of this encounter: 5' 1.02\" (1.55 m).    Weight as of this encounter: 81 kg (178 lb 9.2 oz).      Physical Exam  Ht 5' 1.02\" (1.55 m)   Wt 81 kg (178 lb 9.2 oz)   LMP 06/30/2018 (Within Weeks)   BMI 33.72 kg/m²   General/Constitutional: No apparent distress: well-nourished and well developed.  Eyes: normal ocular motion  Cardio: RRR, Normal S1S2, No m/r/g.   Lymphatic: No appreciable lymphadenopathy  Respiratory: Non-labored breathing, CTA b/l no w/c/r  Vascular: No edema, swelling or tenderness, except as " noted in detailed exam. Extremities well perfused. No LE edema  Integumentary: No impressive skin lesions present, except as noted in detailed exam.  Neuro: No ataxia or tremors noted  Psych: Normal mood and affect, oriented to person, place and time. Appropriate affect.  Musculoskeletal: Normal, except as noted in detailed exam and in HPI.    Gait and Station:   normal and antalgic    Left Knee Exam:      Inspection:  normal color, temperature, turgor and moisture    Overall limb alignment: neutral    Effusion: minimal    ROM 5 to 80    Extensor Lag: Absent    Palpation: Lateral joint line tenderness to palpation, lateral anterior knee pain  Unable to tolerate Pradip's or Thessaly    Motor: 5/5 EHL/FHL/TA/GS/Qd/Hs    Vascular: Toes WWP with BCR    Sensory: SILT DP/SP/Harshad/Saph/Ti  Distal extremity warm and well perfused       Images:  I personally reviewed relevant images in the PACS system and my interpretation is as follows:    X-rays of the left knee: mild joint space narrowing, subchondral sclerosis, subchondral cysts, osteophyte formation. No acute fracture or dislocation.       Scribe Attestation      I,:  Umesh Caceres PA-C am acting as a scribe while in the presence of the attending physician.:       I,:  Keaton Collado MD personally performed the services described in this documentation    as scribed in my presence.:             Keaton Collado MD  Adult Reconstruction Specialist   [facility]

## 2024-10-07 ENCOUNTER — TELEPHONE (OUTPATIENT)
Age: 59
End: 2024-10-07

## 2024-10-08 ENCOUNTER — APPOINTMENT (OUTPATIENT)
Dept: LAB | Age: 59
End: 2024-10-08
Payer: COMMERCIAL

## 2024-10-08 ENCOUNTER — OFFICE VISIT (OUTPATIENT)
Dept: OBGYN CLINIC | Facility: CLINIC | Age: 59
End: 2024-10-08
Payer: COMMERCIAL

## 2024-10-08 VITALS
BODY MASS INDEX: 33.61 KG/M2 | WEIGHT: 178 LBS | HEART RATE: 92 BPM | HEIGHT: 61 IN | SYSTOLIC BLOOD PRESSURE: 128 MMHG | DIASTOLIC BLOOD PRESSURE: 82 MMHG

## 2024-10-08 DIAGNOSIS — S83.242A TEAR OF MEDIAL MENISCUS OF LEFT KNEE, CURRENT, UNSPECIFIED TEAR TYPE, INITIAL ENCOUNTER: Primary | ICD-10-CM

## 2024-10-08 DIAGNOSIS — S83.242A TEAR OF MEDIAL MENISCUS OF LEFT KNEE, CURRENT, UNSPECIFIED TEAR TYPE, INITIAL ENCOUNTER: ICD-10-CM

## 2024-10-08 DIAGNOSIS — M23.92 INTERNAL DERANGEMENT OF LEFT KNEE: ICD-10-CM

## 2024-10-08 LAB
ALBUMIN SERPL BCG-MCNC: 4.7 G/DL (ref 3.5–5)
ALP SERPL-CCNC: 52 U/L (ref 34–104)
ALT SERPL W P-5'-P-CCNC: 15 U/L (ref 7–52)
ANION GAP SERPL CALCULATED.3IONS-SCNC: 9 MMOL/L (ref 4–13)
AST SERPL W P-5'-P-CCNC: 21 U/L (ref 13–39)
ATRIAL RATE: 74 BPM
BASOPHILS # BLD AUTO: 0.01 THOUSANDS/ΜL (ref 0–0.1)
BASOPHILS NFR BLD AUTO: 0 % (ref 0–1)
BILIRUB SERPL-MCNC: 0.6 MG/DL (ref 0.2–1)
BUN SERPL-MCNC: 19 MG/DL (ref 5–25)
CALCIUM SERPL-MCNC: 9.3 MG/DL (ref 8.4–10.2)
CHLORIDE SERPL-SCNC: 102 MMOL/L (ref 96–108)
CO2 SERPL-SCNC: 28 MMOL/L (ref 21–32)
CREAT SERPL-MCNC: 0.53 MG/DL (ref 0.6–1.3)
EOSINOPHIL # BLD AUTO: 0.04 THOUSAND/ΜL (ref 0–0.61)
EOSINOPHIL NFR BLD AUTO: 1 % (ref 0–6)
ERYTHROCYTE [DISTWIDTH] IN BLOOD BY AUTOMATED COUNT: 12.7 % (ref 11.6–15.1)
GFR SERPL CREATININE-BSD FRML MDRD: 104 ML/MIN/1.73SQ M
GLUCOSE P FAST SERPL-MCNC: 91 MG/DL (ref 65–99)
HCT VFR BLD AUTO: 42.3 % (ref 34.8–46.1)
HGB BLD-MCNC: 13.7 G/DL (ref 11.5–15.4)
IMM GRANULOCYTES # BLD AUTO: 0.02 THOUSAND/UL (ref 0–0.2)
IMM GRANULOCYTES NFR BLD AUTO: 0 % (ref 0–2)
LYMPHOCYTES # BLD AUTO: 1.4 THOUSANDS/ΜL (ref 0.6–4.47)
LYMPHOCYTES NFR BLD AUTO: 26 % (ref 14–44)
MCH RBC QN AUTO: 30 PG (ref 26.8–34.3)
MCHC RBC AUTO-ENTMCNC: 32.4 G/DL (ref 31.4–37.4)
MCV RBC AUTO: 93 FL (ref 82–98)
MONOCYTES # BLD AUTO: 0.45 THOUSAND/ΜL (ref 0.17–1.22)
MONOCYTES NFR BLD AUTO: 8 % (ref 4–12)
NEUTROPHILS # BLD AUTO: 3.55 THOUSANDS/ΜL (ref 1.85–7.62)
NEUTS SEG NFR BLD AUTO: 65 % (ref 43–75)
NRBC BLD AUTO-RTO: 0 /100 WBCS
P AXIS: 37 DEGREES
PLATELET # BLD AUTO: 308 THOUSANDS/UL (ref 149–390)
PMV BLD AUTO: 10.7 FL (ref 8.9–12.7)
POTASSIUM SERPL-SCNC: 3.9 MMOL/L (ref 3.5–5.3)
PR INTERVAL: 148 MS
PROT SERPL-MCNC: 7.1 G/DL (ref 6.4–8.4)
QRS AXIS: 40 DEGREES
QRSD INTERVAL: 80 MS
QT INTERVAL: 392 MS
QTC INTERVAL: 435 MS
RBC # BLD AUTO: 4.57 MILLION/UL (ref 3.81–5.12)
SODIUM SERPL-SCNC: 139 MMOL/L (ref 135–147)
T WAVE AXIS: 52 DEGREES
VENTRICULAR RATE: 74 BPM
WBC # BLD AUTO: 5.47 THOUSAND/UL (ref 4.31–10.16)

## 2024-10-08 PROCEDURE — 93010 ELECTROCARDIOGRAM REPORT: CPT | Performed by: INTERNAL MEDICINE

## 2024-10-08 PROCEDURE — 80053 COMPREHEN METABOLIC PANEL: CPT

## 2024-10-08 PROCEDURE — 85025 COMPLETE CBC W/AUTO DIFF WBC: CPT

## 2024-10-08 PROCEDURE — 36415 COLL VENOUS BLD VENIPUNCTURE: CPT

## 2024-10-08 PROCEDURE — 99215 OFFICE O/P EST HI 40 MIN: CPT | Performed by: ORTHOPAEDIC SURGERY

## 2024-10-08 RX ORDER — METOPROLOL TARTRATE 25 MG/1
75 TABLET, FILM COATED ORAL DAILY
COMMUNITY

## 2024-10-08 RX ORDER — CHLORHEXIDINE GLUCONATE ORAL RINSE 1.2 MG/ML
15 SOLUTION DENTAL ONCE
Status: CANCELLED | OUTPATIENT
Start: 2024-10-10 | End: 2024-10-08

## 2024-10-08 RX ORDER — IBUPROFEN 400 MG/1
TABLET, FILM COATED ORAL EVERY 6 HOURS PRN
COMMUNITY
End: 2024-10-11

## 2024-10-08 RX ORDER — CEFAZOLIN SODIUM 2 G/50ML
2000 SOLUTION INTRAVENOUS ONCE
Status: CANCELLED | OUTPATIENT
Start: 2024-10-10 | End: 2024-10-08

## 2024-10-08 NOTE — PRE-PROCEDURE INSTRUCTIONS
Pre-Surgery Instructions:   Medication Instructions    ALPRAZolam (XANAX) 0.5 mg tablet Uses PRN- OK to take day of surgery    Ascorbic Acid (VITAMIN C) 100 MG tablet Stop taking 7 days prior to surgery.    Calcium-Magnesium-Vitamin D (CALCIUM 500 PO) Stop taking 7 days prior to surgery.    ibuprofen (MOTRIN) 400 mg tablet Stop taking 3 days prior to surgery.    metoprolol tartrate (LOPRESSOR) 25 mg tablet Take day of surgery.    multivitamin (THERAGRAN) TABS Stop taking 7 days prior to surgery.    PARoxetine (PAXIL) 10 mg tablet Take day of surgery.     Pt verbalizes understanding of the following:    Please reference your “My Surgical Experience Booklet” for additional information to prepare for your upcoming surgery.      - DO NOT EAT OR DRINK ANYTHING after midnight on the evening before your procedure including coffee, tea, gum or hard candy.    - ONLY SIPS OF WATER with your medications are allowed on the morning of your procedure.  - Avoid OTC non-directed Vit/ Suppl/ Herbals 7 days prior to surgery to ensure no drug interactions with perioperative surgical/ anesthetic meds  - Avoid NSAIDs 3 days prior. Tylenol is ok to take as needed.   - Avoid ASA containing products 5 days prior, unless otherwise instructed by your provider   - Bring a list of meds you take at home with your last dose noted    - Avoid alcohol 24hrs before your surgery.     - Follow the pre surgery showering instructions as listed in the “My Surgical Experience Booklet” or otherwise provided by your surgeon's office.  - Bathing instructions, has chg, neck down, no genitals  - No lotions, powders, sprays, deodorant, perfume, jewelry, body piercings, false lashes or make-up  - Do not use a blade to shave the surgical area 1 week before surgery. It is ok to use clean electric clippers up to 24 hours before surgery. Do not shave any body parts with a razor within 24hrs.  - Do not use dry shampoo, hair spray, hair gel, or any type of hair  products.   - Remove nail polish, including gel polish, and any artificial, gel, or acrylic nails if possible.    - For outpatient surgery, arrange for someone to drive you home after the procedure & stay with you until the next morning. Visitor guidelines discussed.   - Bring insurance cards & photo id    - Please remove your contact lens. Bring a case for your glasses (or contacts).  - Leave all valuables such as credit cards, money & jewelry at home  - Please bring any specially ordered equipment; crutches  - Wear causal clothing that is easy to take on and off. Consider your type of surgery.    - Notify surgeon if you develop any new illnesses, exposure, develop a rash/ open wounds or have additional questions prior to your surgery.    - Did the surgeon's office give you any other special instructions? no  - Did surgeon require any clearances? no    You will receive a call one business day prior to surgery with an arrival time and hospital directions. If your surgery is scheduled on a Monday, the hospital will be calling you on the Friday prior to your surgery.     Please confirm the visitor policy for the day of your procedure when you receive your phone call with an arrival time.

## 2024-10-08 NOTE — PROGRESS NOTES
CHIEF COMPLAINT/REASON FOR VISIT  Chief Complaint   Patient presents with    Left Knee - Pain     Referred by Dr Harding        HISTORY OF PRESENT ILLNESS  Pricilla Norman is a 58 y.o. female who presents for evaluation of her left knee.  Patient was seen originally on October 2 in the ER for left knee pain.  She was found to be stable for discharge and sent to follow-up with orthopedics.  She did follow-up with Dr. Collado who subsequently ordered MRI to rule out internal derangement.  She was found to have a medial meniscus tear and was referred to orthopedics with surgery for further evaluation. Patient said she has continued to experience pain since she was last seen by Dr. Collado. She said the pain started about a week ago after she was moving heavy furniture. She describes the pain on the medial, lateral, and posterior aspects of the knee. She has been trying knee brace, NSAIDs, RICE, and activity modification which has not helped with her symptoms.  Patient feels her symptoms are disabling and affecting her activities of daily living.  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    REVIEW OF SYSTEMS  Review of systems was performed and, outside that mentioned in the HPI, it was negative for symptomology related to the integumentary, hematologic, immunologic, allergic, neurologic, cardiovascular, respiratory, GI or  systems.    MEDICAL HISTORY  Patient Active Problem List   Diagnosis    Internal derangement of left knee       SURGICAL HISTORY  Past Surgical History:   Procedure Laterality Date    ADENOIDECTOMY      BREAST BIOPSY Right 05/2014    Benign    BREAST LUMPECTOMY Bilateral 2005    FISTULA REPAIR  1993    MAMMO (HISTORICAL) Bilateral 05/29/2018    no evidence of malignancy    TONSILLECTOMY  1977       CURRENT MEDICATIONS    Current Outpatient Medications:     ALPRAZolam (XANAX) 0.5 mg tablet, Take 0.5 mg by mouth 3 (three) times a day as needed for anxiety., Disp: , Rfl:     Ascorbic Acid  (VITAMIN C) 100 MG tablet, Take 100 mg by mouth daily, Disp: , Rfl:     Calcium-Magnesium-Vitamin D (CALCIUM 500 PO), Take by mouth., Disp: , Rfl:     FLAXSEED, LINSEED, PO, Take by mouth, Disp: , Rfl:     multivitamin (THERAGRAN) TABS, Take 1 tablet by mouth daily., Disp: , Rfl:     Nutritional Supplements (WOMENS FORMULA MENOPAUSE PO), Take 1 tablet by mouth daily, Disp: , Rfl:     Omega-3 Fatty Acids (FISH OIL PO), Take 1 tablet by mouth daily, Disp: , Rfl:     oxyCODONE (Roxicodone) 5 immediate release tablet, Take 1 tablet (5 mg total) by mouth 3 (three) times a day as needed for moderate pain Max Daily Amount: 15 mg, Disp: 9 tablet, Rfl: 0    PARoxetine (PAXIL) 10 mg tablet, Take 1 tablet (10 mg total) by mouth daily, Disp: 90 tablet, Rfl: 1    PARoxetine Mesylate 7.5 MG CAPS, Take 1 capsule (7.5 mg total) by mouth daily (Patient not taking: Reported on 4/7/2020), Disp: 30 capsule, Rfl: 5    Potassium 99 MG TABS, Take 1 tablet by mouth daily., Disp: , Rfl:     thiamine (VITAMIN B1) 100 mg tablet, Take 100 mg by mouth daily, Disp: , Rfl:     SOCIAL HISTORY  Social History     Socioeconomic History    Marital status: /Civil Union     Spouse name: Not on file    Number of children: Not on file    Years of education: Not on file    Highest education level: Not on file   Occupational History    Not on file   Tobacco Use    Smoking status: Never    Smokeless tobacco: Never   Substance and Sexual Activity    Alcohol use: Not Currently     Comment: occasional    Drug use: No    Sexual activity: Yes     Partners: Male     Birth control/protection: Post-menopausal, Male Sterilization   Other Topics Concern    Not on file   Social History Narrative    Not on file     Social Determinants of Health     Financial Resource Strain: Not on file   Food Insecurity: Not on file   Transportation Needs: Not on file   Physical Activity: Not on file   Stress: Not on file   Social Connections: Not on file   Intimate Partner  "Violence: Not on file   Housing Stability: Not on file       Objective     VITAL SIGNS  /82   Pulse 92   Ht 5' 1\" (1.549 m)   Wt 80.7 kg (178 lb)   LMP 06/30/2018 (Within Weeks)   BMI 33.63 kg/m²      PHYSICAL EXAMINATION    Musculoskeletal: Left Knee Examination:  General: The patient is alert, oriented, and pleasant to interact with.  Patient ambulates with Antalgic gait pattern  Assistive Device: Crutches  Alignment: normal  Skin is warm and dry to touch with no signs of erythema, ecchymosis, or infection   Effusion: minimal  ROM: 0° - 90°  verus 0° - 135° on contralateral side  MMT: deferred  TTP: Medial joint line, lateral femoral condyle  Flexor and extensor mechanisms are intact   Knee is stable to varus and valgus stress  Pradip's Test Positive Medial    Lachman's Test - 1A  2+ DP and PT pulses with brisk capillary refill to the toes  Sural, saphenous, tibial, superficial, and deep peroneal motor and sensory distributions intact  Sensation light touch intact distally    RADIOGRAPHIC EXAMINATION/DIAGNOSTICS:  Left knee MRI appears to show medial meniscus posterior root tear     Procedure: MRI knee left  wo contrast    Result Date: 10/4/2024  Narrative: MRI LEFT KNEE INDICATION:   M25.562: Pain in left knee M23.92: Unspecified internal derangement of left knee. Severe left knee pain, despite no acute fracture or osseous injury. COMPARISON: Left knee plain films from 10/2/2024. TECHNIQUE:    Multiplanar/multisequence MR of the left knee was performed. FINDINGS: SUBCUTANEOUS TISSUES: Normal JOINT EFFUSION: Physiologic amount of fluid in the knee. Multiple small calcified intra-articular bodies in the posterior central portion of the knee adjacent to the PCL (series 7 images 16-18.) BAKER'S CYST: None. MENISCI: Large complex tear of the medial meniscus posterior meniscal root and adjacent posterior horn (series 7 images 18-22.) Lateral meniscus is intact. CRUCIATE LIGAMENTS: Intact. EXTENSOR " APPARATUS: Intact. COLLATERAL LIGAMENTS: Intact. ARTICULAR SURFACES: Medial compartment: Mild osteoarthritis. Diffuse partial-thickness cartilage loss and marginal osteophytes. Lateral compartment: Mild osteoarthritis. Diffuse partial-thickness cartilage loss and marginal osteophytes. Patellofemoral compartment: Severe osteoarthritis. Full-thickness cartilage loss over large portions of the patella and trochlea. Subchondral cystic changes, subchondral marrow edema and marginal osteophytes. BONES: Normal. MUSCULATURE:  Intact.     Impression: Large complex tear of the medial meniscus posterior meniscal root and adjacent posterior horn (series 7 images 18-22.) Severe patellofemoral compartment and mild medial and lateral tibiofemoral compartment osteoarthritis. Multiple small calcified intra-articular bodies in the posterior central portion of the knee adjacent to the PCL (series 7 images 16-18.) Workstation performed: [id number]     Procedure: XR knee 4+ views left injury    Result Date: 10/2/2024  Narrative: XR KNEE 4+ VW LEFT INJURY INDICATION: knee gave out w/ lateral knee pain. COMPARISON: 9/10/2014. FINDINGS: No acute fracture or dislocation. No joint effusion. Minimal tricompartmental degenerative changes. No lytic or blastic osseous lesion. Unremarkable soft tissues.     Impression: No acute osseous abnormality. Computerized Assisted Algorithm (CAA) may have been used to analyze all applicable images. Workstation performed: [id number]      ASSESSMENT/PLAN:  Left knee pain; medial meniscus root tear    We discussed the importance of injury to the meniscus. We also discussed the role of the menisci as a shock absorber, but also as a secondary stabilizer of the knee in terms of stability. We also described that there can be associated wear and degeneration of the articular cartilage which may also be playing a role in her symptoms. Sometimes this can be difficult to distinguish from meniscus  symptoms.    Options for treatment of the meniscus include partial meniscectomy versus repair. Options for treatment of the meniscus include partial meniscectomy versus repair in this setting. If repair is considered, this is more preserving for the meniscus tissue. However, there is also a chance a repair may not heal.    We discussed the typical rehabilitation following meniscus partial debridement. There is certainly a risk of re injury returning to twisting and pivoting activities. We discussed risks of surgery, which include knee stiffness, infection, risk of reinjury and future arthritis.    She will call to schedule knee arthroscopy with meniscus debridement as indicated. She will need a preoperative listing appointment and physical therapy.     Scribe Attestation      I,:  Lambert Hunt PA-C am acting as a scribe while in the presence of the attending physician.:       I,:  Martínez Nur MD personally performed the services described in this documentation    as scribed in my presence.:

## 2024-10-10 ENCOUNTER — HOSPITAL ENCOUNTER (OUTPATIENT)
Facility: HOSPITAL | Age: 59
Setting detail: OUTPATIENT SURGERY
Discharge: HOME | End: 2024-10-11
Attending: ORTHOPAEDIC SURGERY | Admitting: ORTHOPAEDIC SURGERY
Payer: COMMERCIAL

## 2024-10-10 ENCOUNTER — ANESTHESIA EVENT (OUTPATIENT)
Dept: PERIOP | Facility: HOSPITAL | Age: 59
End: 2024-10-10
Payer: COMMERCIAL

## 2024-10-10 ENCOUNTER — ANESTHESIA (OUTPATIENT)
Dept: PERIOP | Facility: HOSPITAL | Age: 59
End: 2024-10-10
Payer: COMMERCIAL

## 2024-10-10 DIAGNOSIS — S83.242A TEAR OF MEDIAL MENISCUS OF LEFT KNEE, UNSPECIFIED TEAR TYPE, UNSPECIFIED WHETHER OLD OR CURRENT TEAR, INITIAL ENCOUNTER: Primary | ICD-10-CM

## 2024-10-10 DIAGNOSIS — R11.2 PONV (POSTOPERATIVE NAUSEA AND VOMITING): ICD-10-CM

## 2024-10-10 DIAGNOSIS — Z98.890 PONV (POSTOPERATIVE NAUSEA AND VOMITING): ICD-10-CM

## 2024-10-10 PROCEDURE — 29882 ARTHRS KNE SRG MNISC RPR M/L: CPT

## 2024-10-10 PROCEDURE — 29881 ARTHRS KNE SRG MNISECTMY M/L: CPT

## 2024-10-10 PROCEDURE — 29882 ARTHRS KNE SRG MNISC RPR M/L: CPT | Performed by: ORTHOPAEDIC SURGERY

## 2024-10-10 PROCEDURE — C1713 ANCHOR/SCREW BN/BN,TIS/BN: HCPCS | Performed by: ORTHOPAEDIC SURGERY

## 2024-10-10 PROCEDURE — 29881 ARTHRS KNE SRG MNISECTMY M/L: CPT | Performed by: ORTHOPAEDIC SURGERY

## 2024-10-10 DEVICE — IMPLANTABLE DEVICE: Type: IMPLANTABLE DEVICE | Site: KNEE | Status: FUNCTIONAL

## 2024-10-10 RX ORDER — SCOLOPAMINE TRANSDERMAL SYSTEM 1 MG/1
1 PATCH, EXTENDED RELEASE TRANSDERMAL ONCE AS NEEDED
Status: DISCONTINUED | OUTPATIENT
Start: 2024-10-10 | End: 2024-10-11 | Stop reason: HOSPADM

## 2024-10-10 RX ORDER — ACETAMINOPHEN 10 MG/ML
1000 INJECTION, SOLUTION INTRAVENOUS ONCE
Status: DISCONTINUED | OUTPATIENT
Start: 2024-10-10 | End: 2024-10-10

## 2024-10-10 RX ORDER — LIDOCAINE HYDROCHLORIDE 10 MG/ML
INJECTION, SOLUTION EPIDURAL; INFILTRATION; INTRACAUDAL; PERINEURAL AS NEEDED
Status: DISCONTINUED | OUTPATIENT
Start: 2024-10-10 | End: 2024-10-10

## 2024-10-10 RX ORDER — TRAMADOL HYDROCHLORIDE 50 MG/1
50 TABLET ORAL EVERY 8 HOURS PRN
Qty: 30 TABLET | Refills: 0 | Status: SHIPPED | OUTPATIENT
Start: 2024-10-10 | End: 2024-10-20

## 2024-10-10 RX ORDER — HYDROMORPHONE HCL/PF 1 MG/ML
0.5 SYRINGE (ML) INJECTION
Status: DISCONTINUED | OUTPATIENT
Start: 2024-10-10 | End: 2024-10-10 | Stop reason: HOSPADM

## 2024-10-10 RX ORDER — DEXAMETHASONE SODIUM PHOSPHATE 10 MG/ML
INJECTION, SOLUTION INTRAMUSCULAR; INTRAVENOUS AS NEEDED
Status: DISCONTINUED | OUTPATIENT
Start: 2024-10-10 | End: 2024-10-10

## 2024-10-10 RX ORDER — SODIUM CHLORIDE 9 MG/ML
125 INJECTION, SOLUTION INTRAVENOUS CONTINUOUS
Status: DISCONTINUED | OUTPATIENT
Start: 2024-10-10 | End: 2024-10-11 | Stop reason: HOSPADM

## 2024-10-10 RX ORDER — MIDAZOLAM HYDROCHLORIDE 2 MG/2ML
INJECTION, SOLUTION INTRAMUSCULAR; INTRAVENOUS
Status: COMPLETED | OUTPATIENT
Start: 2024-10-10 | End: 2024-10-10

## 2024-10-10 RX ORDER — PROPOFOL 10 MG/ML
INJECTION, EMULSION INTRAVENOUS AS NEEDED
Status: DISCONTINUED | OUTPATIENT
Start: 2024-10-10 | End: 2024-10-10

## 2024-10-10 RX ORDER — FENTANYL CITRATE/PF 50 MCG/ML
25 SYRINGE (ML) INJECTION
Status: COMPLETED | OUTPATIENT
Start: 2024-10-10 | End: 2024-10-10

## 2024-10-10 RX ORDER — ACETAMINOPHEN 325 MG/1
325 TABLET ORAL EVERY 6 HOURS PRN
Qty: 30 TABLET | Refills: 0 | Status: SHIPPED | OUTPATIENT
Start: 2024-10-10

## 2024-10-10 RX ORDER — PROPOFOL 10 MG/ML
INJECTION, EMULSION INTRAVENOUS CONTINUOUS PRN
Status: DISCONTINUED | OUTPATIENT
Start: 2024-10-10 | End: 2024-10-10

## 2024-10-10 RX ORDER — ACETAMINOPHEN 10 MG/ML
1000 INJECTION, SOLUTION INTRAVENOUS ONCE AS NEEDED
Status: DISCONTINUED | OUTPATIENT
Start: 2024-10-10 | End: 2024-10-10 | Stop reason: SDUPTHER

## 2024-10-10 RX ORDER — FENTANYL CITRATE 50 UG/ML
INJECTION, SOLUTION INTRAMUSCULAR; INTRAVENOUS
Status: COMPLETED | OUTPATIENT
Start: 2024-10-10 | End: 2024-10-10

## 2024-10-10 RX ORDER — SODIUM CHLORIDE, SODIUM LACTATE, POTASSIUM CHLORIDE, CALCIUM CHLORIDE 600; 310; 30; 20 MG/100ML; MG/100ML; MG/100ML; MG/100ML
INJECTION, SOLUTION INTRAVENOUS CONTINUOUS PRN
Status: DISCONTINUED | OUTPATIENT
Start: 2024-10-10 | End: 2024-10-10

## 2024-10-10 RX ORDER — ONDANSETRON 2 MG/ML
4 INJECTION INTRAMUSCULAR; INTRAVENOUS ONCE AS NEEDED
Status: DISCONTINUED | OUTPATIENT
Start: 2024-10-10 | End: 2024-10-10 | Stop reason: HOSPADM

## 2024-10-10 RX ORDER — ROPIVACAINE HYDROCHLORIDE 5 MG/ML
INJECTION, SOLUTION EPIDURAL; INFILTRATION; PERINEURAL
Status: COMPLETED | OUTPATIENT
Start: 2024-10-10 | End: 2024-10-10

## 2024-10-10 RX ORDER — OXYCODONE HYDROCHLORIDE 5 MG/1
5 TABLET ORAL EVERY 4 HOURS PRN
Status: DISCONTINUED | OUTPATIENT
Start: 2024-10-10 | End: 2024-10-11

## 2024-10-10 RX ORDER — CHLORHEXIDINE GLUCONATE ORAL RINSE 1.2 MG/ML
15 SOLUTION DENTAL ONCE
Status: COMPLETED | OUTPATIENT
Start: 2024-10-10 | End: 2024-10-10

## 2024-10-10 RX ORDER — OXYCODONE HYDROCHLORIDE 5 MG/1
5 TABLET ORAL EVERY 4 HOURS PRN
Qty: 15 TABLET | Refills: 0 | Status: SHIPPED | OUTPATIENT
Start: 2024-10-10 | End: 2024-10-10

## 2024-10-10 RX ORDER — CEFAZOLIN SODIUM 2 G/50ML
2000 SOLUTION INTRAVENOUS ONCE
Status: COMPLETED | OUTPATIENT
Start: 2024-10-10 | End: 2024-10-10

## 2024-10-10 RX ORDER — KETOROLAC TROMETHAMINE 30 MG/ML
INJECTION, SOLUTION INTRAMUSCULAR; INTRAVENOUS AS NEEDED
Status: DISCONTINUED | OUTPATIENT
Start: 2024-10-10 | End: 2024-10-10

## 2024-10-10 RX ORDER — LIDOCAINE HCL/EPINEPHRINE/PF 2%-1:200K
VIAL (ML) INJECTION AS NEEDED
Status: DISCONTINUED | OUTPATIENT
Start: 2024-10-10 | End: 2024-10-10

## 2024-10-10 RX ORDER — NAPROXEN 500 MG/1
500 TABLET ORAL 2 TIMES DAILY WITH MEALS
Qty: 60 TABLET | Refills: 0 | Status: SHIPPED | OUTPATIENT
Start: 2024-10-10

## 2024-10-10 RX ORDER — TRAMADOL HYDROCHLORIDE 50 MG/1
50 TABLET ORAL EVERY 6 HOURS PRN
Status: DISCONTINUED | OUTPATIENT
Start: 2024-10-10 | End: 2024-10-11 | Stop reason: HOSPADM

## 2024-10-10 RX ORDER — ACETAMINOPHEN 10 MG/ML
1000 INJECTION, SOLUTION INTRAVENOUS ONCE
Status: COMPLETED | OUTPATIENT
Start: 2024-10-10 | End: 2024-10-11

## 2024-10-10 RX ORDER — ONDANSETRON 2 MG/ML
INJECTION INTRAMUSCULAR; INTRAVENOUS AS NEEDED
Status: DISCONTINUED | OUTPATIENT
Start: 2024-10-10 | End: 2024-10-10

## 2024-10-10 RX ADMIN — SCOPOLAMINE 1 PATCH: 1.5 PATCH, EXTENDED RELEASE TRANSDERMAL at 18:19

## 2024-10-10 RX ADMIN — SODIUM CHLORIDE, SODIUM LACTATE, POTASSIUM CHLORIDE, AND CALCIUM CHLORIDE: .6; .31; .03; .02 INJECTION, SOLUTION INTRAVENOUS at 09:51

## 2024-10-10 RX ADMIN — ACETAMINOPHEN 1000 MG: 10 INJECTION INTRAVENOUS at 19:15

## 2024-10-10 RX ADMIN — CHLORHEXIDINE GLUCONATE 15 ML: 1.2 RINSE ORAL at 09:19

## 2024-10-10 RX ADMIN — FENTANYL CITRATE 100 MCG: 50 INJECTION INTRAMUSCULAR; INTRAVENOUS at 09:50

## 2024-10-10 RX ADMIN — FENTANYL CITRATE 25 MCG: 50 INJECTION INTRAMUSCULAR; INTRAVENOUS at 10:53

## 2024-10-10 RX ADMIN — ROPIVACAINE HYDROCHLORIDE 30 ML: 5 INJECTION EPIDURAL; INFILTRATION; PERINEURAL at 09:50

## 2024-10-10 RX ADMIN — FENTANYL CITRATE 25 MCG: 50 INJECTION INTRAMUSCULAR; INTRAVENOUS at 11:52

## 2024-10-10 RX ADMIN — ONDANSETRON 4 MG: 2 INJECTION INTRAMUSCULAR; INTRAVENOUS at 10:26

## 2024-10-10 RX ADMIN — MIDAZOLAM 4 MG: 1 INJECTION INTRAMUSCULAR; INTRAVENOUS at 09:50

## 2024-10-10 RX ADMIN — FENTANYL CITRATE 25 MCG: 50 INJECTION INTRAMUSCULAR; INTRAVENOUS at 11:40

## 2024-10-10 RX ADMIN — DEXAMETHASONE SODIUM PHOSPHATE 10 MG: 10 INJECTION INTRAMUSCULAR; INTRAVENOUS at 10:26

## 2024-10-10 RX ADMIN — LIDOCAINE HYDROCHLORIDE,EPINEPHRINE BITARTRATE 20 ML: 20; .005 INJECTION, SOLUTION EPIDURAL; INFILTRATION; INTRACAUDAL; PERINEURAL at 09:51

## 2024-10-10 RX ADMIN — HYDROMORPHONE HYDROCHLORIDE 0.5 MG: 1 INJECTION, SOLUTION INTRAMUSCULAR; INTRAVENOUS; SUBCUTANEOUS at 12:14

## 2024-10-10 RX ADMIN — SODIUM CHLORIDE 125 ML/HR: 0.9 INJECTION, SOLUTION INTRAVENOUS at 20:59

## 2024-10-10 RX ADMIN — PROPOFOL 200 MG: 10 INJECTION, EMULSION INTRAVENOUS at 10:26

## 2024-10-10 RX ADMIN — FENTANYL CITRATE 25 MCG: 50 INJECTION INTRAMUSCULAR; INTRAVENOUS at 11:35

## 2024-10-10 RX ADMIN — TRAMADOL HYDROCHLORIDE 50 MG: 50 TABLET, COATED ORAL at 20:47

## 2024-10-10 RX ADMIN — FENTANYL CITRATE 25 MCG: 50 INJECTION INTRAMUSCULAR; INTRAVENOUS at 11:30

## 2024-10-10 RX ADMIN — SODIUM CHLORIDE 125 ML/HR: 0.9 INJECTION, SOLUTION INTRAVENOUS at 09:15

## 2024-10-10 RX ADMIN — LIDOCAINE HYDROCHLORIDE 100 MG: 10 INJECTION, SOLUTION EPIDURAL; INFILTRATION; INTRACAUDAL; PERINEURAL at 10:26

## 2024-10-10 RX ADMIN — KETOROLAC TROMETHAMINE 30 MG: 30 INJECTION, SOLUTION INTRAMUSCULAR; INTRAVENOUS at 11:23

## 2024-10-10 RX ADMIN — FENTANYL CITRATE 50 MCG: 50 INJECTION INTRAMUSCULAR; INTRAVENOUS at 10:49

## 2024-10-10 RX ADMIN — SODIUM CHLORIDE, SODIUM LACTATE, POTASSIUM CHLORIDE, AND CALCIUM CHLORIDE: .6; .31; .03; .02 INJECTION, SOLUTION INTRAVENOUS at 10:35

## 2024-10-10 RX ADMIN — CEFAZOLIN SODIUM 2000 MG: 2 SOLUTION INTRAVENOUS at 10:30

## 2024-10-10 RX ADMIN — PROPOFOL 40 MCG/KG/MIN: 10 INJECTION, EMULSION INTRAVENOUS at 10:30

## 2024-10-10 RX ADMIN — TRIMETHOBENZAMIDE HYDROCHLORIDE 200 MG: 100 INJECTION INTRAMUSCULAR at 15:55

## 2024-10-10 RX ADMIN — HYDROMORPHONE HYDROCHLORIDE 0.5 MG: 1 INJECTION, SOLUTION INTRAMUSCULAR; INTRAVENOUS; SUBCUTANEOUS at 11:59

## 2024-10-10 RX ADMIN — FENTANYL CITRATE 25 MCG: 50 INJECTION INTRAMUSCULAR; INTRAVENOUS at 11:16

## 2024-10-10 NOTE — DISCHARGE INSTR - AVS FIRST PAGE
POSTOPERATIVE INSTRUCTIONS following KNEE SURGERY    MEDICATIONS:  Resume all home medications unless otherwise instructed by your surgeon.  Pain Medication: Tramadol 50 mg 1 tablet every 8 hours as needed  If you were given a regional anesthetic (nerve block), please begin taking the pain medication as soon as you get home, even if you have minimal or no pain.  DO NOT WAIT FOR THE NERVE BLOCK TO WEAR OFF.  Possible side effects include nausea, constipation, and urinary retention.  If you experience these side effects, please call our office for assistance.  Pain med refills are authorized only during office hours (8am-4pm, Mon-Fri).  Anti-Inflammatory:  Naproxen 500 mg, 1 tablet every 12 hours for 4 weeks and Tylenol 325 mg, 1-2 tablets every 6 hours for 4 weeks  Take with food.  Stop if you experience nausea, reflux, or stomach pain.  Blood Clot Prevention:  Aspirin 81mg twice daily x 30 days  Pump your foot up and down 20 times per hour while you are less mobile.  If you were previously on an anticoagulation medication (blood thinner) you may begin this the following morning    WOUND CARE:  Keep the dressing clean and dry.  Light drainage may occur the first 2 days postop.  You may remove the dressings and get the incision wet in the shower 72 hours after surgery.  Do not remove steri-strips or sutures.  Do not immerse the incision under water.  Carefully pat the incision dry.  If there is wound drainage, re-apply a fresh dry gauze dressing.  Please call our office ([phone]) if you experience either of the following:  Sudden increase in swelling, redness, or warmth at the surgical site  Excessive incisional drainage that persists beyond the 3rd day after surgery  Oral temperature greater than 101 degrees, not relieved with Tylenol  Shortness of breath, chest pain, nausea, or any other concerning symptoms    SWELLING CONTROL:  Cold Therapy:  The cold therapy device may be used either continuously or only as  "needed, according to your preference.  Do not let the pad directly touch your skin.  Alternatively, apply ice (20 min on, 20 min off) as often as you feel is necessary.  Elevation:  Elevate the entire leg above heart level.  Place pillows under your ankle to keep your knee straight.  Compression:  Apply ACE wraps or a thigh-length compression stocking as needed.    RANGE OF MOTION:  You are allowed full range of motion as tolerated.    IMMOBILIZATION:  None.  You are allowed full range of motion as tolerated.    ACTIVITY:   Toe touch weight bearing on the operative leg. Use crutches to assist only as needed.  Using Crutches on Stairs:  Going up, lead with your \"good\" (nonoperative) leg.  Going down, lead with your \"bad\" (operative) leg.  Use a hand rail when available.  Knee Extension:  Place a rolled towel or pillow under your ankle for 20-30 minutes 3-5 times per day.  This will help to maintain full knee extension.  Quad Sets:  Sit or lie with your knee straight.  Tighten your quadriceps (front thigh) muscle.  Hold for 3 seconds, then relax.  Repeat 20 times per hour while awake.    PHYSICAL THERAPY:  Begin therapy 5 to 7 days after surgery. We have placed a referral for physical therapy after the procedure to begin knee range of motion and strengthening. Please call to get set-up with physical therapy.    FOLLOW-UP APPOINTMENT:  10-14 days after surgery with:    Dr. Martínez Nur MD  [facility]  [address] [place], [address] ([facility])  [address] [place], [address] [facility])  [phone]   "

## 2024-10-10 NOTE — PROGRESS NOTES
Pt has been in [facility] for 5.5 hours.  C/O nausea and dizziness.  Also receives significant dose of post op pain pedicing.  I had a conversation with her about our option.  I ordered a scopolamine patch.  If that does not improve her in an hour or so, She may need to be admitted.

## 2024-10-10 NOTE — ANESTHESIA PREPROCEDURE EVALUATION
Procedure:  Knee arthroscopy for medial meniscus (psb root) debridement vs repair (Left: Knee)    Relevant Problems   ANESTHESIA   (+) PONV (postoperative nausea and vomiting)      CARDIO   (+) Hypertension      GI/HEPATIC   (+) GERD (gastroesophageal reflux disease)        Physical Exam    Airway    Mallampati score: II         Dental       Cardiovascular  Rhythm: regular, Rate: normal    Pulmonary   Breath sounds clear to auscultation    Other Findings  post-pubertal.      Anesthesia Plan  ASA Score- 2     Anesthesia Type- general with ASA Monitors.         Additional Monitors:     Airway Plan:            Plan Factors-Exercise tolerance (METS): >4 METS.    Chart reviewed.   Existing labs reviewed. Patient summary reviewed.    Patient is not a current smoker.              Induction- intravenous.    Postoperative Plan-     Perioperative Resuscitation Plan - Level 1 - Full Code.       Informed Consent- Anesthetic plan and risks discussed with patient.

## 2024-10-10 NOTE — ANESTHESIA POSTPROCEDURE EVALUATION
Post-Op Assessment Note    CV Status:  Stable  Pain Score: 8    Pain management: inadequate       Mental Status:  Alert and awake   Hydration Status:  Euvolemic   PONV Controlled:  Controlled   Airway Patency:  Patent     Post Op Vitals Reviewed: Yes    No anethesia notable event occurred.    Staff: CRNA           Last Filed PACU Vitals:  Vitals Value Taken Time   Temp     Pulse 97 10/10/24 1128   /73 10/10/24 1126   Resp 14 10/10/24 1128   SpO2 97 % 10/10/24 1128   Vitals shown include unfiled device data.    Modified Hallie:  No data recorded    Pain treated in PACU. MD notified

## 2024-10-10 NOTE — ANESTHESIA POSTPROCEDURE EVALUATION
Post-Op Assessment Note    CV Status:  Stable  Pain Score: 1    Pain management: adequate       Mental Status:  Alert and awake   Hydration Status:  Euvolemic   PONV Controlled:  Controlled   Airway Patency:  Patent     Post Op Vitals Reviewed: Yes    No anethesia notable event occurred.    Staff: Anesthesiologist           Last Filed PACU Vitals:  Vitals Value Taken Time   Temp 97.7 °F (36.5 °C) 10/10/24 1125   Pulse 84 10/10/24 1151   /61 10/10/24 1141   Resp 18 10/10/24 1151   SpO2 100 % 10/10/24 1151   Vitals shown include unfiled device data.    Modified Hallie:  Activity: 2 (10/10/2024 11:40 AM)  Respiration: 2 (10/10/2024 11:40 AM)  Circulation: 2 (10/10/2024 11:40 AM)  Consciousness: 1 (10/10/2024 11:40 AM)  Oxygen Saturation: 1 (10/10/2024 11:40 AM)  Modified Hallie Score: 8 (10/10/2024 11:40 AM)

## 2024-10-10 NOTE — ANESTHESIA PROCEDURE NOTES
Peripheral Block    Patient location during procedure: holding area  Start time: 10/10/2024 9:42 AM  Reason for block: at surgeon's request and post-op pain management  Staffing  Performed by: Rito Waterman DO  Authorized by: Rito Waterman DO    Preanesthetic Checklist  Completed: patient identified, IV checked, site marked, risks and benefits discussed, surgical consent, monitors and equipment checked, pre-op evaluation and timeout performed  Peripheral Block  Patient position: supine  Prep: ChloraPrep  Patient monitoring: continuous pulse oximetry, frequent blood pressure checks and heart rate  Anesthesia block type: intraarticular.  Laterality: left  Injection technique: single-shot  fentanyl citrate (PF) 100 MCG/2ML 50 mcg - Intravenous   100 mcg - 10/10/2024 9:50:00 AM  midazolam (VERSED) injection 0.5 mg - Intravenous   4 mg - 10/10/2024 9:50:00 AM  ropivacaine (NAROPIN) 0.5 % injection 20 mL - Perineural   30 mL - 10/10/2024 9:50:00 AM

## 2024-10-11 VITALS
RESPIRATION RATE: 22 BRPM | HEART RATE: 68 BPM | HEIGHT: 62 IN | TEMPERATURE: 98.2 F | BODY MASS INDEX: 32.46 KG/M2 | WEIGHT: 176.37 LBS | DIASTOLIC BLOOD PRESSURE: 62 MMHG | SYSTOLIC BLOOD PRESSURE: 116 MMHG | OXYGEN SATURATION: 96 %

## 2024-10-11 PROCEDURE — 99024 POSTOP FOLLOW-UP VISIT: CPT | Performed by: PHYSICIAN ASSISTANT

## 2024-10-11 RX ORDER — INSULIN LISPRO 100 [IU]/ML
1 INJECTION, SOLUTION INTRAVENOUS; SUBCUTANEOUS
COMMUNITY

## 2024-10-11 RX ORDER — ALPRAZOLAM 0.5 MG
0.5 TABLET ORAL 3 TIMES DAILY PRN
Status: DISCONTINUED | OUTPATIENT
Start: 2024-10-11 | End: 2024-10-11 | Stop reason: HOSPADM

## 2024-10-11 RX ORDER — OXYCODONE HYDROCHLORIDE 5 MG/1
5 TABLET ORAL EVERY 4 HOURS PRN
Status: DISCONTINUED | OUTPATIENT
Start: 2024-10-11 | End: 2024-10-11

## 2024-10-11 RX ORDER — INSULIN GLARGINE 100 [IU]/ML
1 INJECTION, SOLUTION SUBCUTANEOUS DAILY
COMMUNITY

## 2024-10-11 RX ORDER — TRAMADOL HYDROCHLORIDE 50 MG/1
50 TABLET ORAL EVERY 6 HOURS PRN
Status: DISCONTINUED | OUTPATIENT
Start: 2024-10-11 | End: 2024-10-11 | Stop reason: SDUPTHER

## 2024-10-11 RX ORDER — TRAMADOL HYDROCHLORIDE 50 MG/1
100 TABLET ORAL EVERY 6 HOURS PRN
Status: DISCONTINUED | OUTPATIENT
Start: 2024-10-11 | End: 2024-10-11 | Stop reason: HOSPADM

## 2024-10-11 RX ORDER — ASPIRIN 81 MG/1
81 TABLET, CHEWABLE ORAL 2 TIMES DAILY
Status: DISCONTINUED | OUTPATIENT
Start: 2024-10-11 | End: 2024-10-11 | Stop reason: HOSPADM

## 2024-10-11 RX ORDER — PAROXETINE 10 MG/1
10 TABLET, FILM COATED ORAL DAILY
Status: DISCONTINUED | OUTPATIENT
Start: 2024-10-11 | End: 2024-10-11 | Stop reason: HOSPADM

## 2024-10-11 RX ADMIN — TRAMADOL HYDROCHLORIDE 50 MG: 50 TABLET, COATED ORAL at 03:19

## 2024-10-11 RX ADMIN — METOPROLOL TARTRATE 75 MG: 50 TABLET, FILM COATED ORAL at 09:24

## 2024-10-11 RX ADMIN — ASPIRIN 81 MG CHEWABLE TABLET 81 MG: 81 TABLET CHEWABLE at 09:24

## 2024-10-11 RX ADMIN — SODIUM CHLORIDE 125 ML/HR: 0.9 INJECTION, SOLUTION INTRAVENOUS at 04:00

## 2024-10-11 RX ADMIN — B-COMPLEX W/ C & FOLIC ACID TAB 1 TABLET: TAB at 09:24

## 2024-10-11 RX ADMIN — TRAMADOL HYDROCHLORIDE 100 MG: 50 TABLET, COATED ORAL at 16:31

## 2024-10-11 RX ADMIN — SODIUM CHLORIDE 125 ML/HR: 0.9 INJECTION, SOLUTION INTRAVENOUS at 12:06

## 2024-10-11 RX ADMIN — PAROXETINE HYDROCHLORIDE 10 MG: 10 TABLET, FILM COATED ORAL at 09:24

## 2024-10-11 RX ADMIN — TRAMADOL HYDROCHLORIDE 50 MG: 50 TABLET, COATED ORAL at 09:24

## 2024-10-11 NOTE — ASSESSMENT & PLAN NOTE
Consult to medicine to assist with management of symptoms as she is still having postoperative issues with symptoms.

## 2024-10-11 NOTE — OP NOTE
OPERATIVE REPORT  PATIENT NAME: Pricilla Norman    :  1965  MRN: [id number]  Pt Location: [facility] OR ROOM 05    SURGERY DATE: 10/10/2024    Surgeons and Role:     * Martínez Nur MD - Primary     * Lambert Hunt PA-C - Assisting    Preop Diagnosis:  Tear of medial meniscus of left knee, current, unspecified tear type, initial encounter [S83.242A]    Post-Op Diagnosis Codes:     * Tear of medial meniscus of left knee, current, unspecified tear type, initial encounter [S83.242A]    Procedure(s):  Left - Knee arthroscopy for medial meniscus repair and  lateral debridement    Specimen(s):  * No specimens in log *    Estimated Blood Loss:   Minimal    Drains:  * No LDAs found *    Anesthesia Type:   General    Operative Indications:  Tear of medial meniscus of left knee, current, unspecified tear type, initial encounter [S83.242A]      Operative Findings:  Complex tear posterior horn/root was medial meniscus  Complex tear of the posterior horn lateral meniscus.  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 Left knee arthroscopy, medial meniscus root repair  2. Lateral meniscus debridement  3. Patellofemoral chondroplasty      WOUND TYPE: 1     DRAINS  None.     COMPLICATIONS  None.     FINDINGS:  See below      ANESTHESIA TYPE  Regional + LMA     IMPLANTS:  Implant Name Type Inv. Item Serial No.  Lot No. LRB No. Used Action   FIBERSTITCH IMPLANT KNEE 1.5 STR - [id number]  FIBERSTITCH IMPLANT KNEE 1.5 STR  [id number] Left 1 Implanted       ESTIMATED BLOOD LOSS: < 25 ml    SPECIMENS: none     INDICATION:  Pricilla Norman is a 58 y.o. female who was diagnosed with a posterior horn/root tear of the medial meniscus. Based on history, examination, and advanced imaging. Accordingly, after a lengthy discussion of the risks and benefits and alternatives to both operative and non-operative treatment, she  elected to proceed with operative intervention, and I am in agreement. Please see my clinical documentation for additional details on the history, exam, and discussion regarding surgical decision making.     PRE-PROCEDURE PROTOCOL:  The patient was identified in the preoperative holding area where informed consent and surgical site marking were confirmed. The patient was then transferred to the operating room where anesthesia was administered by our Anesthesia colleagues in accordance with our preoperative plan. The operative extremity was prepped and draped in the usual sterile fashion. A procedural pause was performed to verify correct patient identification, procedure to be performed, laterality, agreement of all team members, availability of all equipment, and administration of preoperative antibiotics. Afterwards, the procedure commenced.     DESCRIPTION OF PROCEDURE  Examination of the knee under anesthesia was performed. This revealed 1A lachman with 0-135 degrees of motion.     Diagnostic arthroscopy was performed by inserting the camera through an inferolateral portal. An inferomedial portal was created under needle localization. Findings were as follows:     Medial Compartment: Complex tear of the posterior horn/root with a parrot-beak configuration.  A small portion of the meniscus was excised, the remaining root was repaired with all inside suture device stabilizing the root and the posterior horn to its anatomic location.  The remaining meniscus was probed and found to be stable.  The medial compartment had diffuse grade III chondromalacia.     Lateral compartment: Complex tear of the posterior horn lateral meniscus with fraying that was gently debrided with an arthroscopic shaver.  Less than 10% of the posterior horn was excised.  The lateral compartment demonstrated diffuse grade II chondromalacia.     Trochlea: Diffuse grade III chondromalacia     Patella: Diffuse grade III chondromalacia.     There  was no evidence of loose bodies or other pathology in the suprapatellar pouch, medial gutter, or lateral gutter.     The arthroscopic equipment was removed and fluid drained from the knee. The wound was closed in a standard fashion. A sterile dressing was applied. The patient was transferred to the PACU where she recovered without complication.     POSTOPERATIVE PLAN:     POSTOPERATIVE REHABILITATION:  Early Mobilization and TTWB x 2-4 weeks     FOLLOW-UP: We will plan on seeing them back in approximately 1-2 weeks for wound check, sutures out and advancement of physical therapy as indicated.     A physician assistant was required during the procedure for retraction, tissue handling, dissection and suturing. There was no qualified resident available to assist    I was present for the entire procedure.    Patient Disposition:  PACU       SIGNATURE: Martínez Nur MD  DATE: October 11, 2024  TIME: 11:04 AM

## 2024-10-11 NOTE — PLAN OF CARE
Problem: PAIN - ADULT  Goal: Verbalizes/displays adequate comfort level or baseline comfort level  Description: Interventions:  - Encourage patient to monitor pain and request assistance  - Assess pain using appropriate pain scale  - Administer analgesics based on type and severity of pain and evaluate response  - Implement non-pharmacological measures as appropriate and evaluate response  - Consider cultural and social influences on pain and pain management  - Notify physician/advanced practitioner if interventions unsuccessful or patient reports new pain  10/10/2024 2305 by Hortencia Mcdonald RN  Outcome: Progressing  10/10/2024 2305 by Hortencia Mcdonald RN  Outcome: Progressing     Problem: INFECTION - ADULT  Goal: Absence or prevention of progression during hospitalization  Description: INTERVENTIONS:  - Assess and monitor for signs and symptoms of infection  - Monitor lab/diagnostic results  - Monitor all insertion sites, i.e. indwelling lines, tubes, and drains  - Monitor endotracheal if appropriate and nasal secretions for changes in amount and color  - [place] appropriate cooling/warming therapies per order  - Administer medications as ordered  - Instruct and encourage patient and family to use good hand hygiene technique  - Identify and instruct in appropriate isolation precautions for identified infection/condition  10/10/2024 2305 by Hortencia Mcdonald RN  Outcome: Progressing  10/10/2024 2305 by Hortencia Mcdonald RN  Outcome: Progressing  Goal: Absence of fever/infection during neutropenic period  Description: INTERVENTIONS:  - Monitor WBC    10/10/2024 2305 by Hortencia Mcdonald RN  Outcome: Progressing  10/10/2024 2305 by Hortencia Mcdonald RN  Outcome: Progressing     Problem: SAFETY ADULT  Goal: Patient will remain free of falls  Description: INTERVENTIONS:  - Educate patient/family on patient safety including physical limitations  - Instruct patient to call for assistance with activity   -  Consult OT/PT to assist with strengthening/mobility   - Keep Call bell within reach  - Keep bed low and locked with side rails adjusted as appropriate  - Keep care items and personal belongings within reach  - Initiate and maintain comfort rounds  - Make Fall Risk Sign visible to staff  - Offer Toileting every Hours, in advance of need  - Initiate/Maintain alarm  - Obtain necessary fall risk management equipment:   - Apply yellow socks and bracelet for high fall risk patients  - Consider moving patient to room near nurses station  10/10/2024 2305 by Hortencia Mcdonald RN  Outcome: Progressing  10/10/2024 2305 by Hortencia Mcdonald RN  Outcome: Progressing  Goal: Maintain or return to baseline ADL function  Description: INTERVENTIONS:  -  Assess patient's ability to carry out ADLs; assess patient's baseline for ADL function and identify physical deficits which impact ability to perform ADLs (bathing, care of mouth/teeth, toileting, grooming, dressing, etc.)  - Assess/evaluate cause of self-care deficits   - Assess range of motion  - Assess patient's mobility; develop plan if impaired  - Assess patient's need for assistive devices and provide as appropriate  - Encourage maximum independence but intervene and supervise when necessary  - Involve family in performance of ADLs  - Assess for home care needs following discharge   - Consider OT consult to assist with ADL evaluation and planning for discharge  - Provide patient education as appropriate  10/10/2024 2305 by Hortencia Mcdonald RN  Outcome: Progressing  10/10/2024 2305 by Hortencia Mcdonald RN  Outcome: Progressing  Goal: Maintains/Returns to pre admission functional level  Description: INTERVENTIONS:  - Perform AM-PAC 6 Click Basic Mobility/ Daily Activity assessment daily.  - Set and communicate daily mobility goal to care team and patient/family/caregiver.   - Collaborate with rehabilitation services on mobility goals if consulted  - Perform Range of Motion  times a day.  - Reposition patient every  hours.  - Dangle patient  times a day  - Stand patient  times a day  - Ambulate patient  times a day  - Out of bed to chair  times a day   - Out of bed for meals imes a day  - Out of bed for toileting  - Record patient progress and toleration of activity level   10/10/2024 2305 by Hortencia Mcdonald RN  Outcome: Progressing  10/10/2024 2305 by Hortencia Mcdnoald RN  Outcome: Progressing     Problem: DISCHARGE PLANNING  Goal: Discharge to home or other facility with appropriate resources  Description: INTERVENTIONS:  - Identify barriers to discharge w/patient and caregiver  - Arrange for needed discharge resources and transportation as appropriate  - Identify discharge learning needs (meds, wound care, etc.)  - Arrange for interpretive services to assist at discharge as needed  - Refer to Case Management Department for coordinating discharge planning if the patient needs post-hospital services based on physician/advanced practitioner order or complex needs related to functional status, cognitive ability, or social support system  10/10/2024 2305 by Hortencia Mcdonald RN  Outcome: Progressing  10/10/2024 2305 by Hortencia Mcdonald RN  Outcome: Progressing

## 2024-10-11 NOTE — PROGRESS NOTES
Progress Note - Orthopedics   Name: Pricilla Norman 58 y.o. female I MRN: [id number]  Unit/Bed#: E5 -01 I Date of Admission: 10/10/2024   Date of Service: 10/11/2024 I Hospital Day: 0    Assessment & Plan  GERD (gastroesophageal reflux disease)    Hypertension    PONV (postoperative nausea and vomiting)  Consult to medicine to assist with management of symptoms as she is still having postoperative issues with symptoms.   Tear of medial meniscus of left knee, unspecified tear type, unspecified whether old or current tear, initial encounter    Internal derangement of left knee  POD 1 left knee arthroscopy  - Toe Touch WB to the Left Knee  - Pain control  - PT/OT      Orthopedics service will follow.    Subjective   58 y.o.female POD 1 from left knee arthroscopy. Patient had significant post operative Nausea/Vomiting and Dizziness and needed to stay over for management.  No acute events, no new complaints. Pain is not well controlled on medications currently. She is still having dizziness. Unable to tolerate significant PO intake.    Objective :  Temp:  [97 °F (36.1 °C)-98.4 °F (36.9 °C)] 98.4 °F (36.9 °C)  HR:  [55-69] 61  BP: ()/(60-79) 116/62  Resp:  [16-22] 20  SpO2:  [95 %-100 %] 97 %  O2 Device: None (Room air)    Physical Exam  Constitutional:       Appearance: Normal appearance.   Cardiovascular:      Rate and Rhythm: Normal rate.   Pulmonary:      Effort: Pulmonary effort is normal.   Skin:     General: Skin is warm and dry.   Neurological:      General: No focal deficit present.      Mental Status: She is alert and oriented to person, place, and time.   Psychiatric:         Mood and Affect: Mood normal.         Behavior: Behavior normal.       Musculoskeletal: leftlower  No erythema or ecchymosis.  Dressing c/d/i  Motor intact to +FHL/EHL, +ankle dorsi/plantar flexion  Sensation intact to saphenous, sural, tibial, superficial peroneal nerve, and deep peroneal  2+ DP pulse  No calf swelling or  "tenderness to palpation      Lab Results: I have reviewed the following results:  No results for input(s): \"WBC\", \"HGB\", \"HCT\", \"PLT\", \"BANDSPCT\", \"BUN\", \"CREATININE\", \"PTT\", \"INR\", \"ESR\", \"CRP\" in the last 72 hours.  Blood Culture:  No results found for: \"BLOODCX\"  Wound Culture: No results found for: \"WOUNDCULT\"  "

## 2024-10-11 NOTE — NURSING NOTE
Pt states she feels up to being discharged, ate dinner, no vomiting, pain more controlled after Tramadol 100 mg, ortho pa secured chat aware of same, t has been up to BR on crutches, voiding

## 2024-10-13 ENCOUNTER — NURSE TRIAGE (OUTPATIENT)
Dept: OTHER | Facility: OTHER | Age: 59
End: 2024-10-13

## 2024-10-13 DIAGNOSIS — R11.0 NAUSEA AFTER ANESTHESIA, INITIAL ENCOUNTER: Primary | ICD-10-CM

## 2024-10-13 DIAGNOSIS — T88.59XA NAUSEA AFTER ANESTHESIA, INITIAL ENCOUNTER: Primary | ICD-10-CM

## 2024-10-13 RX ORDER — ONDANSETRON 4 MG/1
4 TABLET, FILM COATED ORAL EVERY 8 HOURS PRN
Qty: 20 TABLET | Refills: 0 | Status: SHIPPED | OUTPATIENT
Start: 2024-10-13

## 2024-10-13 NOTE — TELEPHONE ENCOUNTER
"Answer Assessment - Initial Assessment Questions  1. SYMPTOM: \"What's the main symptom you're concerned about?\" (e.g., pain, fever, vomiting)      Nausea post surgery    2. ONSET: \"When did nausea start?\"      Since awaking from surgery; was admitted to manage symptoms    3. SURGERY: \"What surgery did you have?\"      Torn meniscus of Left knee    4. DATE of SURGERY: \"When was the surgery?\"       10/10    5. ANESTHESIA: \"What type of anesthesia did you have?\" (e.g., general, spinal, epidural, local)      ?    6. DRAINS: \"Were any drains place in or around the wound?\" (e.g., Hemovac, [place], [place])      Denies    7. PAIN: \"Is there any pain?\" If Yes, ask: \"How bad is it?\"  (Scale 1-10; or mild, moderate, severe)      4-5/10    8. FEVER: \"Do you have a fever?\" If Yes, ask: \"What is your temperature, how was it measured, and when did it start?\"      Denies    9. VOMITING: \"Is there any vomiting?\" If Yes, ask: \"How many times?\"      Dry heave sensation almost constantly    10. BLEEDING: \"Is there any bleeding?\" If Yes, ask: \"How much?\" and \"Where?\"        Nothing through dressing    11. OTHER SYMPTOMS: \"Do you have any other symptoms?\" (e.g., drainage from wound, painful urination, constipation)        Decreased appetite    Protocols used: Post-Op Symptoms and Questions-[facility]-      On call provider will call in [facility] and recommends bland diet and hydration.  Patient made aware, verbalized understanding.  "

## 2024-10-13 NOTE — TELEPHONE ENCOUNTER
Reason for Disposition  • Other post-op symptom or question    Protocols used: Post-Op Symptoms and Questions-Adult-

## 2024-10-16 ENCOUNTER — EVALUATION (OUTPATIENT)
Dept: PHYSICAL THERAPY | Facility: CLINIC | Age: 59
End: 2024-10-16
Payer: COMMERCIAL

## 2024-10-16 DIAGNOSIS — S83.242A TEAR OF MEDIAL MENISCUS OF LEFT KNEE, UNSPECIFIED TEAR TYPE, UNSPECIFIED WHETHER OLD OR CURRENT TEAR, INITIAL ENCOUNTER: Primary | ICD-10-CM

## 2024-10-16 PROCEDURE — 97161 PT EVAL LOW COMPLEX 20 MIN: CPT | Performed by: PHYSICAL THERAPIST

## 2024-10-16 PROCEDURE — 97110 THERAPEUTIC EXERCISES: CPT | Performed by: PHYSICAL THERAPIST

## 2024-10-16 NOTE — PROGRESS NOTES
PT Evaluation     Today's date: 10/16/2024  Patient name: Pricilla Norman  : 1965  MRN: [id number]  Referring provider: Lambert Hunt PA*  Dx:   Encounter Diagnosis     ICD-10-CM    1. Tear of medial meniscus of left knee, unspecified tear type, unspecified whether old or current tear, initial encounter  S83.242A Ambulatory referral to Physical Therapy                     Assessment  Impairments: abnormal gait, abnormal or restricted ROM, abnormal movement, activity intolerance, impaired balance, impaired physical strength, lacks appropriate home exercise program, pain with function and weight-bearing intolerance  Impairments comments: on 10/10/    Assessment details: Pricilla Norman is a 58 y.o. female presenting to physical therapy s/p left knee arthroscopic repair regarding a complex tear posterior horn/root of the medial meniscus that was repaired and a complex tear of the posterior horn of the lateral meniscus on 10/10/2024 and presents with pain, decreased range of motion, decreased strength, gait/balance dysfunction, and decreased activity tolerance.  Assessment reveals functional movement deficits, ROM and strength impairment consistent with .  Secondary to these impairments, patient has increased difficulty performing ADL's, household chores, and  work related tasks.  Pricilla Kay would benefit from skilled PT to address these issues and maximize function.  Thank you for the referral.    Understanding of Dx/Px/POC: excellent     Prognosis: excellent    Goals  STG (6 weeks)  1. Patient will be independent with HEP  2. Decrease pain at worst by 2 points on NPRS  3. Increase left knee PROM to full and pain free  4. Patient will demonstrate ability to ambulate with normal gait mechanics without AD  LTG (4-6 months)  1. Decrease pain at worst from 4 points on NPRS  2. Increase single hop to within 85% of contralateral limb for resumption of dynamic hiking activities  3. Increase left quad/hamstring  strength equal to the right per MMT  4. Patient will demonstrate ability to perform dynamic SLS without pain or LOB  5. Increase FOTO score > or equal to expected outcome      Plan  Patient would benefit from: skilled PT    Planned therapy interventions: joint mobilization, manual therapy, patient education, neuromuscular re-education, strengthening, stretching, therapeutic activities, therapeutic exercise, transfer training, home exercise program, functional ROM exercises, balance/weight bearing training and ADL training    Frequency: 2x week  Duration in weeks: 16  Treatment plan discussed with: patient        Subjective Evaluation    History of Present Illness  Mechanism of injury: Patient reports to outpatient PT s/p arthroscopic repair regarding a left knee complex tear posterior horn/root of the medial meniscus that was REPAIRED and a complex tear of the posterior horn of the lateral meniscus that was debrided on 10/10/2024.  Dr. Nur educated patient on early mobilization and will be TTWB for 2-4 weeks.  Post-operatively, patient describes the pain as achy and sharp which is worse with movement of the leg and improved with rest and ice.  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  Patients goals for PT are to decrease the pain and return to PLOF.            Recurrent probem    Quality of life: good    Patient Goals  Patient goals for therapy: increased strength, independence with ADLs/IADLs, return to sport/leisure activities, increased motion and decreased pain    Pain  Current pain ratin  At best pain ratin  At worst pain ratin  Quality: dull ache, tight and sharp  Relieving factors: rest, relaxation, ice and medications (Tramadol for pain management)  Aggravating factors: standing and walking    Social Support  Steps to enter house: yes (2 w/ railing)  Stairs in house: no   Lives  "in: one-story house  Lives with: significant other    Employment status: working ([employer])    Diagnostic Tests  MRI studies: abnormal        Objective     Observations     Additional Observation Details  (+) 1+ pitting edema    (+) high risk for DVT per [place] Criteria    *Incisions are clean and dry without exudate    Neurological Testing     Sensation     Knee   Left Knee   Intact: Light touch    Active Range of Motion   Left Knee   Flexion: 85 degrees with pain  Extension: 8 degrees with pain    Passive Range of Motion   Left Knee   Flexion: 104 degrees   Extension: 6 degrees     Mobility   Patellar Mobility:   Left Knee   Hypomobile: left medial, left lateral, left superior and left inferior    Strength/Myotome Testing     Left Knee   Quadriceps contraction: fair    Right Knee   Quadriceps contraction: good    Additional Strength Details  MMT deferred per post-op restrictions             Precautions:   Patient Active Problem List   Diagnosis    Internal derangement of left knee    GERD (gastroesophageal reflux disease)    Hypertension    PONV (postoperative nausea and vomiting)    Tear of medial meniscus of left knee, unspecified tear type, unspecified whether old or current tear, initial encounter     TTWB x4 weeks  ROM as tolerated  Visits 1x/wk per High copay    Manuals 10/16            L knee patellar mobs             L knee PROM             L knee scar mobs                          Neuro Re-Ed                                                                                                        Ther Ex             Recumbent bike rocking for ROM             Quad sets 10 x5\"            SLR 2x10            Heel slides w/ strap 2x10 x5\"            Long sit gastroc stretch 3x20\"            Long sit HS stetch 3x20\"            Ankle TB - dorsiflexion and plantar flexion             Hip extension, abduction ,adduction                                       Ther Activity                                       Gait " Training                                       Modalities

## 2024-10-23 ENCOUNTER — OFFICE VISIT (OUTPATIENT)
Dept: PHYSICAL THERAPY | Facility: CLINIC | Age: 59
End: 2024-10-23
Payer: COMMERCIAL

## 2024-10-23 DIAGNOSIS — M25.562 ACUTE PAIN OF LEFT KNEE: ICD-10-CM

## 2024-10-23 DIAGNOSIS — S83.242A TEAR OF MEDIAL MENISCUS OF LEFT KNEE, UNSPECIFIED TEAR TYPE, UNSPECIFIED WHETHER OLD OR CURRENT TEAR, INITIAL ENCOUNTER: Primary | ICD-10-CM

## 2024-10-23 PROCEDURE — 97140 MANUAL THERAPY 1/> REGIONS: CPT | Performed by: PHYSICAL THERAPIST

## 2024-10-23 PROCEDURE — 97110 THERAPEUTIC EXERCISES: CPT | Performed by: PHYSICAL THERAPIST

## 2024-10-23 NOTE — PROGRESS NOTES
"Daily Note     Today's date: 10/23/2024  Patient name: Pricilla Norman  : 1965  MRN: [id number]  Referring provider: Lambert Hunt PA*  Dx:   Encounter Diagnosis     ICD-10-CM    1. Tear of medial meniscus of left knee, unspecified tear type, unspecified whether old or current tear, initial encounter  S83.242A       2. Acute pain of left knee  M25.562                      Subjective: Patient reports significant increase in swelling following last treatment session.  Notes difficulty performing any HP and began icing with greater frequency.        Objective: See treatment diary below    L knee suprapatellar inflammation: 42.5 cm    L knee extension: pre treatment was +25 degrees, post treatment was + 11 degrees    Assessment: Significant knee extension deficit present pre-tx as noted.  Focus of session today was to address the extension deficit via manuals, quad sets and self stretching with fair tolerance.  Educated patient to limit bending of the knee right now and shift focus solely on achieving extension.  Moderate suprapatellar inflammation noted today.       Plan: Continue per plan of care.      Precautions:   Patient Active Problem List   Diagnosis    Internal derangement of left knee    GERD (gastroesophageal reflux disease)    Hypertension    PONV (postoperative nausea and vomiting)    Tear of medial meniscus of left knee, unspecified tear type, unspecified whether old or current tear, initial encounter     TTWB x4 weeks  ROM as tolerated  Visits 1x/wk per High copay    Manuals 10/16 10/23           L knee patellar mobs  G4           L knee PROM  6'           L knee scar mobs  2'                        Neuro Re-Ed                                                                                                        Ther Ex             Recumbent bike rocking for ROM             Quad sets 10 x5\" 2x10 x5\"           SLR 2x10            Heel slides w/ strap 2x10 x5\"            Long sit gastroc " "stretch 3x20\" 4x30\"           Long sit HS stetch 3x20\" 4x30\"           Ankle TB - dorsiflexion and plantar flexion - pink  3x10 ea.           Hip extension, abduction ,adduction  Abd 2x10                                     Ther Activity                                       Gait Training                                       Modalities                                            "

## 2024-10-25 ENCOUNTER — OFFICE VISIT (OUTPATIENT)
Dept: OBGYN CLINIC | Facility: CLINIC | Age: 59
End: 2024-10-25

## 2024-10-25 VITALS
HEART RATE: 85 BPM | WEIGHT: 176 LBS | HEIGHT: 61 IN | DIASTOLIC BLOOD PRESSURE: 89 MMHG | SYSTOLIC BLOOD PRESSURE: 144 MMHG | BODY MASS INDEX: 33.23 KG/M2

## 2024-10-25 DIAGNOSIS — S83.242A TEAR OF MEDIAL MENISCUS OF LEFT KNEE, CURRENT, UNSPECIFIED TEAR TYPE, INITIAL ENCOUNTER: Primary | ICD-10-CM

## 2024-10-25 PROCEDURE — 99024 POSTOP FOLLOW-UP VISIT: CPT | Performed by: ORTHOPAEDIC SURGERY

## 2024-10-25 NOTE — PROGRESS NOTES
Subjective   CHIEF COMPLAINT/REASON FOR VISIT  Pricilla Norman is a 58 y.o. female who presents for 2 week post op follow-up after Knee arthroscopy for medial meniscus repair and  lateral debridement - Left on 10/10/2024     HISTORY OF PRESENT ILLNESS  Overall, she feels things are going well and progressing appropriately. Pain has been well controlled. She has been following the post-operative rehabilitation regimen without difficultly. Currently, she is experiencing pain and swelling that is subsiding. Swelling has dramatically decreased since beginning physical therapy.    Objective   PHYSICAL EXAMINATION  Left  Knee  Surgical incisions are healed.     PHYSICAL EXAMINATION    Musculoskeletal: left Knee Examination:  General: The patient is alert, oriented, and pleasant to interact with.  Patient ambulates with Antalgic gait pattern  Assistive Device: Crutches  Alignment: normal  Skin is warm and dry to touch with no signs of erythema, ecchymosis, or infection   Effusion: minimal  ROM: 5° - 105°  verus 0° - 135° on contralateral side  MMT: 4/5 throughout  TTP: Patella  Flexor and extensor mechanisms are intact   Knee is stable to varus and valgus stress  Lateral Patellar Glide - 1/4  Medial Patellar Glide - 1/4  Patella tracks centrally without palpable crepitus  Calf compartments are soft and supple  negative Ashutosh's sign  2+ DP and PT pulses with brisk capillary refill to the toes  Sural, saphenous, tibial, superficial, and deep peroneal motor and sensory distributions intact  Sensation light touch intact distally    Assessment & Plan   1. Status Post Knee arthroscopy for medial meniscus repair and  lateral debridement - Left    Patient is making appropriate progress from the date of their surgery  Recommends WBAT. Slowly transition from two crutches to one crutch and to WBAT without any assisted results.   Can start advancing physical therapy exercises as tolerated  We will plan to see her back at the 3 month  post-operative abiodun  If any issues, questions, or concerns arise between now and the next appointment, we have encouraged the patient contact our team.        Scribe Attestation      I,:   am acting as a scribe while in the presence of the attending physician.:       I,:   personally performed the services described in this documentation    as scribed in my presence.:

## 2024-10-25 NOTE — LETTER
October 25, 2024     Patient: Pricilla Norman  YOB: 1965  Date of Visit: 10/25/2024      To Whom it May Concern:    Pricilla Norman is under my professional care. Pricilla Kay was seen in my office on 10/25/2024. Pricilla Kay is non-weight bearing at work for the next 6-8 weeks until next evaluation. Recommends light duty only at work with 5lb restrictions.     If you have any questions or concerns, please don't hesitate to call.         Sincerely,          Martínez Nur MD        CC: No Recipients

## 2024-10-30 ENCOUNTER — OFFICE VISIT (OUTPATIENT)
Dept: PHYSICAL THERAPY | Facility: CLINIC | Age: 59
End: 2024-10-30
Payer: COMMERCIAL

## 2024-10-30 DIAGNOSIS — M25.562 ACUTE PAIN OF LEFT KNEE: ICD-10-CM

## 2024-10-30 DIAGNOSIS — S83.242A TEAR OF MEDIAL MENISCUS OF LEFT KNEE, UNSPECIFIED TEAR TYPE, UNSPECIFIED WHETHER OLD OR CURRENT TEAR, INITIAL ENCOUNTER: Primary | ICD-10-CM

## 2024-10-30 PROCEDURE — 97140 MANUAL THERAPY 1/> REGIONS: CPT | Performed by: PHYSICAL THERAPIST

## 2024-10-30 PROCEDURE — 97110 THERAPEUTIC EXERCISES: CPT | Performed by: PHYSICAL THERAPIST

## 2024-10-30 NOTE — PROGRESS NOTES
"Daily Note     Today's date: 10/30/2024  Patient name: Pricilla Norman  : 1965  MRN: [id number]  Referring provider: Lambert Hunt PA*  Dx:   Encounter Diagnosis     ICD-10-CM    1. Tear of medial meniscus of left knee, unspecified tear type, unspecified whether old or current tear, initial encounter  S83.242A       2. Acute pain of left knee  M25.562                        Subjective: Patient reports improvement in weight bearing and states that ortho instructed her to reduce to 1 crutch but notes too much discomfort with this weight bearing      Objective: See treatment diary below    L knee suprapatellar inflammation: 41.5 cm    L knee extension: pre treatment was +12 degrees, post treatment was + 5 degrees; PROM flexion to     Assessment: Left knee PROM significantly improved since last session and suprapatellar inflammation is down 1 cm.  Patients quad activation greatly improved as well, but difficulty performing SLR without quad lag.  Advanced quad activation exercises via SAQ and LAQ which were performed pain free.  Reviewed ambulation with 1 crutch.       Plan: Continue per plan of care.      Precautions:   Patient Active Problem List   Diagnosis    Internal derangement of left knee    GERD (gastroesophageal reflux disease)    Hypertension    PONV (postoperative nausea and vomiting)    Tear of medial meniscus of left knee, unspecified tear type, unspecified whether old or current tear, initial encounter     TTWB x4 weeks  ROM as tolerated  Visits 1x/wk per High copay    Manuals 10/16 10/23 10/30          L knee patellar mobs  G4 G4          L knee PROM  6' 6'          L knee scar mobs  2' 2'                       Neuro Re-Ed                                                                                                        Ther Ex             Recumbent bike rocking for ROM             Quad sets 10 x5\" 2x10 x5\" 2x10 x5\"          SLR 2x10  2x5          Heel slides w/ strap 2x10 x5\"  10 " "x10\"          Long sit gastroc stretch 3x20\" 4x30\" 4x30\"          Long sit HS stetch 3x20\" 4x30\" 4x30\"          Ankle TB - dorsiflexion and plantar flexion - pink  3x10 ea.           Hip extension, abduction ,adduction  Abd 2x10 Abd 2x10          SAQ   2x10          LAQ   2x10                                                 Ther Activity                                       Gait Training                                       Modalities                                            "

## 2024-11-02 DIAGNOSIS — S83.242A TEAR OF MEDIAL MENISCUS OF LEFT KNEE, UNSPECIFIED TEAR TYPE, UNSPECIFIED WHETHER OLD OR CURRENT TEAR, INITIAL ENCOUNTER: ICD-10-CM

## 2024-11-04 RX ORDER — ASPIRIN 81 MG/1
81 TABLET, COATED ORAL 2 TIMES DAILY
Qty: 180 TABLET | Refills: 1 | Status: SHIPPED | OUTPATIENT
Start: 2024-11-04

## 2024-11-06 ENCOUNTER — OFFICE VISIT (OUTPATIENT)
Dept: PHYSICAL THERAPY | Facility: CLINIC | Age: 59
End: 2024-11-06
Payer: COMMERCIAL

## 2024-11-06 DIAGNOSIS — M25.562 ACUTE PAIN OF LEFT KNEE: ICD-10-CM

## 2024-11-06 DIAGNOSIS — S83.242A TEAR OF MEDIAL MENISCUS OF LEFT KNEE, UNSPECIFIED TEAR TYPE, UNSPECIFIED WHETHER OLD OR CURRENT TEAR, INITIAL ENCOUNTER: Primary | ICD-10-CM

## 2024-11-06 PROCEDURE — 97140 MANUAL THERAPY 1/> REGIONS: CPT | Performed by: PHYSICAL THERAPIST

## 2024-11-06 PROCEDURE — 97110 THERAPEUTIC EXERCISES: CPT | Performed by: PHYSICAL THERAPIST

## 2024-11-06 NOTE — PROGRESS NOTES
"Daily Note     Today's date: 2024  Patient name: Pricilla Norman  : 1965  MRN: [id number]  Referring provider: Lambert Hunt PA*  Dx:   Encounter Diagnosis     ICD-10-CM    1. Tear of medial meniscus of left knee, unspecified tear type, unspecified whether old or current tear, initial encounter  S83.242A       2. Acute pain of left knee  M25.562                          Subjective: Patient reports some discomfort and difficulty weight bearing using a single axillary crutch.        Objective: See treatment diary below    L knee PROM: 3-120 degrees    Assessment: Left knee PROM steadily progressing, able to perform a few cycles on the recumbent bike.  Progressed weight bearing activity with biofeedback per biodex which patient demonstrated improved left LE weight with increased time; greatest difficulty noted shifting weight further in the cone of stability.  With gait training, slow gait but appropriate mechanics noted with a single crutch.        Plan: Continue per plan of care.      Precautions:   Patient Active Problem List   Diagnosis    Internal derangement of left knee    GERD (gastroesophageal reflux disease)    Hypertension    PONV (postoperative nausea and vomiting)    Tear of medial meniscus of left knee, unspecified tear type, unspecified whether old or current tear, initial encounter     TTWB x4 weeks  ROM as tolerated  Visits 1x/wk per High copay    L knee arthroscopy for medial meniscus repair and lateral debridement on 10/10/2024    Manuals 10/16 10/23 10/30 11/6         L knee patellar mobs  G4 G4 G4         L knee PROM  6' 6' 6'         L knee scar mobs  2' 2' 2'                      Neuro Re-Ed             Biodex weight shifts M/L    2'         Biodex Maze motor control    Maze L2 x1                                                                          Ther Ex             Recumbent bike rocking for ROM    5'         Quad sets 10 x5\" 2x10 x5\" 2x10 x5\" 2x10 x5\"         Heel " "slides w/ strap 2x10 x5\"  10 x10\" 10 x10\"         Hip extension, abduction ,adduction, flexion  Abd 2x10 Abd 2x10 2x10 ea.         LAQ   2x10 3x10         Leg Press - 90 deg knee bend    25#  3x10         Mini squats    NV         Standing gastroc stretch    3x20\" on L                                   Ther Activity             Gait training    1 crutch 5'                                                Modalities                                            "

## 2024-11-13 ENCOUNTER — OFFICE VISIT (OUTPATIENT)
Dept: PHYSICAL THERAPY | Facility: CLINIC | Age: 59
End: 2024-11-13
Payer: COMMERCIAL

## 2024-11-13 DIAGNOSIS — M25.562 ACUTE PAIN OF LEFT KNEE: ICD-10-CM

## 2024-11-13 DIAGNOSIS — S83.242A TEAR OF MEDIAL MENISCUS OF LEFT KNEE, UNSPECIFIED TEAR TYPE, UNSPECIFIED WHETHER OLD OR CURRENT TEAR, INITIAL ENCOUNTER: Primary | ICD-10-CM

## 2024-11-13 PROCEDURE — 97140 MANUAL THERAPY 1/> REGIONS: CPT | Performed by: PHYSICAL THERAPIST

## 2024-11-13 PROCEDURE — 97110 THERAPEUTIC EXERCISES: CPT | Performed by: PHYSICAL THERAPIST

## 2024-11-13 NOTE — PROGRESS NOTES
"Daily Note     Today's date: 2024  Patient name: Pricilla Norman  : 1965  MRN: [id number]  Referring provider: Lambert Hunt PA*  Dx:   Encounter Diagnosis     ICD-10-CM    1. Tear of medial meniscus of left knee, unspecified tear type, unspecified whether old or current tear, initial encounter  S83.242A       2. Acute pain of left knee  M25.562                            Subjective: Patient notes frustration with level of swelling in the knee and lack of tolerance for standing and activity.       Objective: See treatment diary below    L knee PROM: 3-120 degrees    L knee suprapatellar inflammation: 42 cm    Assessment: Left knee mobility demonstrates greater restriction pre-tx but this improved greatly throughout and following manuals.  Increased knee inflammation present per measures and educated patient to limit weight bearing and ice with greater frequency.  Held on closed chain exercise progressions until next visit given inflammation.     Plan: Continue per plan of care.      Precautions:   Patient Active Problem List   Diagnosis    Internal derangement of left knee    GERD (gastroesophageal reflux disease)    Hypertension    PONV (postoperative nausea and vomiting)    Tear of medial meniscus of left knee, unspecified tear type, unspecified whether old or current tear, initial encounter     TTWB x4 weeks  ROM as tolerated  Visits 1x/wk per High copay    L knee arthroscopy for medial meniscus repair and lateral debridement on 10/10/2024    Manuals 10/16 10/23 10/30 11/6 11/13        L knee patellar mobs  G4 G4 G4 G4        L knee PROM  6' 6' 6' 6'        L knee scar mobs  2' 2' 2' 2'                     Neuro Re-Ed             Biodex weight shifts M/L    2' 2'        Biodex Maze motor control    Maze L2 x1 Maze L2 x1                                                                         Ther Ex             Recumbent bike rocking for ROM    5' 5'        Quad sets 10 x5\" 2x10 x5\" 2x10 x5\" " "2x10 x5\" 2x10 x5\"        Heel slides w/ strap 2x10 x5\"  10 x10\" 10 x10\" 10 x10\"        Hip extension, abduction ,adduction, flexion  Abd 2x10 Abd 2x10 2x10 ea.         LAQ   2x10 3x10 3x10        Leg Press - 90 deg knee bend    25#  3x10         Mini squats    NV NV        Standing gastroc stretch    3x20\" on L                                   Ther Activity             Gait training    1 crutch 5' 1 crutch 5'                                               Modalities                                            "

## 2024-11-20 ENCOUNTER — OFFICE VISIT (OUTPATIENT)
Dept: PHYSICAL THERAPY | Facility: CLINIC | Age: 59
End: 2024-11-20
Payer: COMMERCIAL

## 2024-11-20 DIAGNOSIS — S83.242A TEAR OF MEDIAL MENISCUS OF LEFT KNEE, UNSPECIFIED TEAR TYPE, UNSPECIFIED WHETHER OLD OR CURRENT TEAR, INITIAL ENCOUNTER: Primary | ICD-10-CM

## 2024-11-20 DIAGNOSIS — M25.562 ACUTE PAIN OF LEFT KNEE: ICD-10-CM

## 2024-11-20 PROCEDURE — 97110 THERAPEUTIC EXERCISES: CPT | Performed by: PHYSICAL THERAPIST

## 2024-11-20 NOTE — PROGRESS NOTES
Daily Note     Today's date: 2024  Patient name: Pricilla Norman  : 1965  MRN: [id number]  Referring provider: Lambert Hunt PA*  Dx:   Encounter Diagnosis     ICD-10-CM    1. Tear of medial meniscus of left knee, unspecified tear type, unspecified whether old or current tear, initial encounter  S83.242A       2. Acute pain of left knee  M25.562                              Subjective: Patient reports continued intermittent pain and swelling.  Notes that she continues to wean from crutches and has been using one.  When attempting to eliminate the crutches notes that the knee will buckle.       Objective: See treatment diary below    L knee PROM: 3-125 degrees    L knee suprapatellar inflammation: 41.5 cm    Assessment: Educated patient to continue using single crutch until she is able to ambulate without instability/buckling.  Left knee swelling down slightly since last session and PROM continues to progress.  Began transition to closed chain strengthening with fair tolerance. Limited VMO recruitment with quad sets which improved greatly with cueing.      Plan: Continue per plan of care. Consider BFR for increasing quad activation next session.      Precautions:   Patient Active Problem List   Diagnosis    Internal derangement of left knee    GERD (gastroesophageal reflux disease)    Hypertension    PONV (postoperative nausea and vomiting)    Tear of medial meniscus of left knee, unspecified tear type, unspecified whether old or current tear, initial encounter     TTWB x4 weeks  ROM as tolerated  Visits 1x/wk per High copay    L knee arthroscopy for medial meniscus repair and lateral debridement on 10/10/2024    Manuals 10/16 10/23 10/30 11/6 11/13 11/20       L knee patellar mobs  G4 G4 G4 G4 G4       L knee PROM  6' 6' 6' 6' 6'       L knee scar mobs  2' 2' 2' 2' 2'                    Neuro Re-Ed             Biodex weight shifts M/L    2' 2'        Biodex Maze motor control    Maze L2 x1  "Maze L2 x1 Maze L2 x3                                                                        Ther Ex             Recumbent bike ROM    5' 5' 5'       Quad sets 10 x5\" 2x10 x5\" 2x10 x5\" 2x10 x5\" 2x10 x5\" 2x10 x5\"       Heel slides w/ strap 2x10 x5\"  10 x10\" 10 x10\" 10 x10\" 10 x10\"       SLR  Abd 2x10 Abd 2x10 2x10 ea.  2x10       LAQ   2x10 3x10 3x10 3x10       Leg Press - 90 deg knee bend    25#  3x10  25# 3x10       Mini squats    NV NV        Standing gastroc stretch    3x20\" on L  3x20\"       Standing heel and toe raises      3x10                    Ther Activity             Gait training    1 crutch 5' 1 crutch 5'                                               Modalities                                            "

## 2024-11-27 ENCOUNTER — OFFICE VISIT (OUTPATIENT)
Dept: PHYSICAL THERAPY | Facility: CLINIC | Age: 59
End: 2024-11-27
Payer: COMMERCIAL

## 2024-11-27 DIAGNOSIS — M25.562 ACUTE PAIN OF LEFT KNEE: ICD-10-CM

## 2024-11-27 DIAGNOSIS — S83.242A TEAR OF MEDIAL MENISCUS OF LEFT KNEE, UNSPECIFIED TEAR TYPE, UNSPECIFIED WHETHER OLD OR CURRENT TEAR, INITIAL ENCOUNTER: Primary | ICD-10-CM

## 2024-11-27 PROCEDURE — 97140 MANUAL THERAPY 1/> REGIONS: CPT | Performed by: PHYSICAL THERAPIST

## 2024-11-27 PROCEDURE — 97110 THERAPEUTIC EXERCISES: CPT | Performed by: PHYSICAL THERAPIST

## 2024-11-27 NOTE — PROGRESS NOTES
Daily Note     Today's date: 2024  Patient name: Pricilla Norman  : 1965  MRN: [id number]  Referring provider: Lambert Hunt PA*  Dx:   Encounter Diagnosis     ICD-10-CM    1. Tear of medial meniscus of left knee, unspecified tear type, unspecified whether old or current tear, initial encounter  S83.242A       2. Acute pain of left knee  M25.562                              Subjective: Patient reports continued discomfort and swelling.  Notes that she has been attempting to do less but remains busy trying to move out of her other home.  Utilizing her daughter for the stair runs.        Objective: See treatment diary below    L knee PROM: 3-125 degrees      Assessment: Fair tolerance with standing squats, maintaining a mostly pain free ROM throughout.  Improved gait using a single crutch with improved weight bearing and appropriate knee flexion/extension.  Difficulty remains activating the quad despite various techniques.  Performed SLR seated to minimize gravity contributions with grater overall tolerance.     Plan: Continue per plan of care. Consider BFR for increasing quad activation next session.      Precautions:   Patient Active Problem List   Diagnosis    Internal derangement of left knee    GERD (gastroesophageal reflux disease)    Hypertension    PONV (postoperative nausea and vomiting)    Tear of medial meniscus of left knee, unspecified tear type, unspecified whether old or current tear, initial encounter     TTWB x4 weeks  ROM as tolerated  Visits 1x/wk per High copay    L knee arthroscopy for medial meniscus repair and lateral debridement on 10/10/2024    Manuals 10/16 10/23 10/30 11/6 11/13 11/20 11/27      L knee patellar mobs  G4 G4 G4 G4 G4 G4      L knee PROM  6' 6' 6' 6' 6' 6'      L knee scar mobs  2' 2' 2' 2' 2' 2'                   Neuro Re-Ed             Biodex weight shifts M/L    2' 2'        Biodex Maze motor control    Maze L2 x1 Maze L2 x1 Maze L2 x3 Maze L2 x3        "                                                                Ther Ex             Recumbent bike ROM    5' 5' 5' 5'      Quad sets 10 x5\" 2x10 x5\" 2x10 x5\" 2x10 x5\" 2x10 x5\" 2x10 x5\" 2x10 x5\"      Heel slides w/ strap 2x10 x5\"  10 x10\" 10 x10\" 10 x10\" 10 x10\" 10 x10\"      SLR  Abd 2x10 Abd 2x10 2x10 ea.  2x10 2x10 chair      LAQ   2x10 3x10 3x10 3x10 3x10      Leg Press - 90 deg knee bend    25#  3x10  25# 3x10       Mini squats    NV NV  2x10      Standing gastroc stretch    3x20\" on L  3x20\" 3x20\"      Standing heel and toe raises      3x10 3x10                   Ther Activity             Gait training    1 crutch 5' 1 crutch 5'                                               Modalities                                            "

## 2024-12-04 ENCOUNTER — OFFICE VISIT (OUTPATIENT)
Dept: PHYSICAL THERAPY | Facility: CLINIC | Age: 59
End: 2024-12-04
Payer: COMMERCIAL

## 2024-12-04 DIAGNOSIS — M25.562 ACUTE PAIN OF LEFT KNEE: ICD-10-CM

## 2024-12-04 DIAGNOSIS — S83.242A TEAR OF MEDIAL MENISCUS OF LEFT KNEE, UNSPECIFIED TEAR TYPE, UNSPECIFIED WHETHER OLD OR CURRENT TEAR, INITIAL ENCOUNTER: Primary | ICD-10-CM

## 2024-12-04 PROCEDURE — 97110 THERAPEUTIC EXERCISES: CPT | Performed by: PHYSICAL THERAPIST

## 2024-12-04 PROCEDURE — 97112 NEUROMUSCULAR REEDUCATION: CPT | Performed by: PHYSICAL THERAPIST

## 2024-12-04 NOTE — PROGRESS NOTES
Daily Note     Today's date: 2024  Patient name: Pricilla Norman  : 1965  MRN: [id number]  Referring provider: Lambert Hunt PA*  Dx:   Encounter Diagnosis     ICD-10-CM    1. Tear of medial meniscus of left knee, unspecified tear type, unspecified whether old or current tear, initial encounter  S83.242A       2. Acute pain of left knee  M25.562                                Subjective: Patient reports frustration with continued swelling and inability to advance with ADL's and leisure functions as a result of the swelling and instability.      Objective: See treatment diary below    L knee PROM: 3-125 degrees      Assessment: Knee ROM is WFL, but limited at end ranges by pain/swelling.  Utilized the biodex for squats to improve weight bearing of the left knee which was tolerated well overall and provided sufficient feedback to improve weight bearing.  Fair tolerance to treatment which continues to me modified based on current strength.     Plan: Continue per plan of care. Consider BFR for increasing quad activation next session (wait until after ortho appt)     Precautions:   Patient Active Problem List   Diagnosis    Internal derangement of left knee    GERD (gastroesophageal reflux disease)    Hypertension    PONV (postoperative nausea and vomiting)    Tear of medial meniscus of left knee, unspecified tear type, unspecified whether old or current tear, initial encounter     TTWB x4 weeks  ROM as tolerated  Visits 1x/wk per High copay    L knee arthroscopy for medial meniscus repair and lateral debridement on 10/10/2024    Manuals 10/16 10/23 10/30 11/6 11/13 11/20 11/27 12/4     L knee patellar mobs  G4 G4 G4 G4 G4 G4      L knee PROM  6' 6' 6' 6' 6' 6'      L knee scar mobs  2' 2' 2' 2' 2' 2'                   Neuro Re-Ed             Biodex weight shifts M/L    2' 2'        Biodex Maze motor control    Maze L2 x1 Maze L2 x1 Maze L2 x3 Maze L2 x3 Maze L2 x2  69%     Biodex % weight bearing  "squats        2x10                                                         Ther Ex             Recumbent bike ROM    5' 5' 5' 5' 5'     Quad sets 10 x5\" 2x10 x5\" 2x10 x5\" 2x10 x5\" 2x10 x5\" 2x10 x5\" 2x10 x5\"      Heel slides w/ strap 2x10 x5\"  10 x10\" 10 x10\" 10 x10\" 10 x10\" 10 x10\"      SLR  Abd 2x10 Abd 2x10 2x10 ea.  2x10 2x10 chair 2x10 chair     LAQ   2x10 3x10 3x10 3x10 3x10 3x10     Leg Press - 90 deg knee bend    25#  3x10  25# 3x10  35# 3x10     Mini squats    NV NV        Standing gastroc stretch    3x20\" on L  3x20\" 3x20\" 3x20\"     Standing heel and toe raises      3x10 3x10      Seated HS curls with TB - orange        3x10     Ther Activity             Gait training    1 crutch 5' 1 crutch 5'                                               Modalities                                            "

## 2024-12-06 ENCOUNTER — OFFICE VISIT (OUTPATIENT)
Dept: OBGYN CLINIC | Facility: CLINIC | Age: 59
End: 2024-12-06

## 2024-12-06 VITALS
BODY MASS INDEX: 32.1 KG/M2 | HEIGHT: 61 IN | WEIGHT: 170 LBS | HEART RATE: 98 BPM | DIASTOLIC BLOOD PRESSURE: 80 MMHG | SYSTOLIC BLOOD PRESSURE: 139 MMHG

## 2024-12-06 DIAGNOSIS — S83.242A TEAR OF MEDIAL MENISCUS OF LEFT KNEE, UNSPECIFIED TEAR TYPE, UNSPECIFIED WHETHER OLD OR CURRENT TEAR, INITIAL ENCOUNTER: Primary | ICD-10-CM

## 2024-12-06 PROCEDURE — 99024 POSTOP FOLLOW-UP VISIT: CPT | Performed by: ORTHOPAEDIC SURGERY

## 2024-12-06 RX ORDER — MELOXICAM 15 MG/1
15 TABLET ORAL DAILY
Qty: 30 TABLET | Refills: 0 | Status: SHIPPED | OUTPATIENT
Start: 2024-12-06

## 2024-12-06 NOTE — PROGRESS NOTES
Subjective   CHIEF COMPLAINT/REASON FOR VISIT  Pricilla Norman is a 58 y.o. female who presents for 8 week post op follow-up after Knee arthroscopy for medial meniscus repair and  lateral debridement - Left on 10/10/2024     HISTORY OF PRESENT ILLNESS  Overall, she feels things are going well and progressing appropriately. She has been following the post-operative rehabilitation regimen without difficultly. Currently, she states that she is frustrated with her progress and level of pain this far out from surgery. She notes that she continues to have swelling in her L knee which inhibits her progress in PT. She states that she is unable to fully bear weight on her L leg because of this.     Objective   PHYSICAL EXAMINATION  Left  Knee  Surgical incisions are healed.     PHYSICAL EXAMINATION    Musculoskeletal: left Knee Examination:  General: The patient is alert, oriented, and pleasant to interact with.  Patient ambulates with Antalgic gait pattern  Assistive Device: Crutches  Alignment: normal  Skin is warm and dry to touch with no signs of erythema, ecchymosis, or infection   Effusion: minimal  ROM: 5° - 105°  verus 0° - 135° on contralateral side  MMT: 4/5 throughout  TTP: Patella  Flexor and extensor mechanisms are intact   Knee is stable to varus and valgus stress  Lateral Patellar Glide - 1/4  Medial Patellar Glide - 1/4  Patella tracks centrally without palpable crepitus  Calf compartments are soft and supple  negative Asuhtosh's sign  2+ DP and PT pulses with brisk capillary refill to the toes  Sural, saphenous, tibial, superficial, and deep peroneal motor and sensory distributions intact  Sensation light touch intact distally    Assessment & Plan   1. Status Post Knee arthroscopy for medial meniscus repair and  lateral debridement - Left    Patient is making appropriate progress from the date of their surgery  Continue with physical therapy   Meloxicam 15mg rx to help with inflammation   We will plan to see  her back at the 3 month post-operative abiodun  If any issues, questions, or concerns arise between now and the next appointment, we have encouraged the patient contact our team.        Scribe Attestation      I,:  Claudia Buckley PA-C am acting as a scribe while in the presence of the attending physician.:       I,:  Martínez Nur MD personally performed the services described in this documentation    as scribed in my presence.:

## 2024-12-06 NOTE — LETTER
December 6, 2024     Patient: Pricilla Norman  YOB: 1965  Date of Visit: 12/6/2024      To Whom it May Concern:    Pricilla Norman is under my professional care. Pricilla Kay was seen in my office on 12/6/2024. Pricilla Kay is unable to return to work. She is still using crutches and recovering from her surgical procedure.     If you have any questions or concerns, please don't hesitate to call.         Sincerely,          Martínez Nur MD        CC: No Recipients

## 2024-12-11 ENCOUNTER — OFFICE VISIT (OUTPATIENT)
Dept: PHYSICAL THERAPY | Facility: CLINIC | Age: 59
End: 2024-12-11
Payer: COMMERCIAL

## 2024-12-11 DIAGNOSIS — S83.242A TEAR OF MEDIAL MENISCUS OF LEFT KNEE, UNSPECIFIED TEAR TYPE, UNSPECIFIED WHETHER OLD OR CURRENT TEAR, INITIAL ENCOUNTER: Primary | ICD-10-CM

## 2024-12-11 DIAGNOSIS — M25.562 ACUTE PAIN OF LEFT KNEE: ICD-10-CM

## 2024-12-11 PROCEDURE — 97112 NEUROMUSCULAR REEDUCATION: CPT | Performed by: PHYSICAL THERAPIST

## 2024-12-11 PROCEDURE — 97110 THERAPEUTIC EXERCISES: CPT | Performed by: PHYSICAL THERAPIST

## 2024-12-11 NOTE — PROGRESS NOTES
"Daily Note     Today's date: 2024  Patient name: Pricilla Norman  : 1965  MRN: [id number]  Referring provider: Lambert Hunt PA*  Dx:   Encounter Diagnosis     ICD-10-CM    1. Tear of medial meniscus of left knee, unspecified tear type, unspecified whether old or current tear, initial encounter  S83.242A       2. Acute pain of left knee  M25.562                                  Subjective: Patient reports starting Meloxicam to edema control prescribed by ortho.  Notes she is off week for at least 6 more weeks.      Objective: See treatment diary below    Assessment: Patients knee inflammation visibly reduced since last visit.  Knee ROM continues to be WFL for progression of functional activity, but remains limited by pain.  Slow movements throughout require VC's to improve motor control and activation.  Contralateral vault present with step ups along with excessive UE usage.      Plan: Continue per plan of care.      Precautions:   Patient Active Problem List   Diagnosis    Internal derangement of left knee    GERD (gastroesophageal reflux disease)    Hypertension    PONV (postoperative nausea and vomiting)    Tear of medial meniscus of left knee, unspecified tear type, unspecified whether old or current tear, initial encounter     Visits 1x/wk per High copay    L knee arthroscopy for medial meniscus repair and lateral debridement on 10/10/2024    Manuals 10/16 10/23 10/30 11/6 11/13 11/20 11/27 12/4 12/11    L knee patellar mobs  G4 G4 G4 G4 G4 G4      L knee PROM  6' 6' 6' 6' 6' 6'      L knee scar mobs  2' 2' 2' 2' 2' 2'                   Neuro Re-Ed             Biodex weight shifts M/L    2' 2'        Biodex Maze motor control    Maze L2 x1 Maze L2 x1 Maze L2 x3 Maze L2 x3 Maze L2 x2  69% Maze L2 x2    Biodex % weight bearing squats        2x10 2x10                                                        Ther Ex             Recumbent bike ROM    5' 5' 5' 5' 5' L1 5'    Quad sets 10 x5\" 2x10 " "x5\" 2x10 x5\" 2x10 x5\" 2x10 x5\" 2x10 x5\" 2x10 x5\"      Heel slides w/ strap 2x10 x5\"  10 x10\" 10 x10\" 10 x10\" 10 x10\" 10 x10\"      SLR  Abd 2x10 Abd 2x10 2x10 ea.  2x10 2x10 chair 2x10 chair 2x10 chair    LAQ   2x10 3x10 3x10 3x10 3x10 3x10 3x10 2#    Leg Press - 90 deg knee bend    25#  3x10  25# 3x10  35# 3x10     Mini squats    NV NV        Standing gastroc stretch    3x20\" on L  3x20\" 3x20\" 3x20\" 3x20\"    Standing heel and toe raises      3x10 3x10  3x10    Seated HS curls with TB - orange        3x10 3x10    Ther Activity             Gait training    1 crutch 5' 1 crutch 5'    3'    Step ups 6\"         2x8                              Modalities                                            "

## 2024-12-18 ENCOUNTER — OFFICE VISIT (OUTPATIENT)
Dept: PHYSICAL THERAPY | Facility: CLINIC | Age: 59
End: 2024-12-18
Payer: COMMERCIAL

## 2024-12-18 DIAGNOSIS — M25.562 ACUTE PAIN OF LEFT KNEE: ICD-10-CM

## 2024-12-18 DIAGNOSIS — S83.242A TEAR OF MEDIAL MENISCUS OF LEFT KNEE, UNSPECIFIED TEAR TYPE, UNSPECIFIED WHETHER OLD OR CURRENT TEAR, INITIAL ENCOUNTER: Primary | ICD-10-CM

## 2024-12-18 PROCEDURE — 97112 NEUROMUSCULAR REEDUCATION: CPT | Performed by: PHYSICAL THERAPIST

## 2024-12-18 PROCEDURE — 97530 THERAPEUTIC ACTIVITIES: CPT | Performed by: PHYSICAL THERAPIST

## 2024-12-18 PROCEDURE — 97110 THERAPEUTIC EXERCISES: CPT | Performed by: PHYSICAL THERAPIST

## 2024-12-18 NOTE — PROGRESS NOTES
Daily Note     Today's date: 2024  Patient name: Pricilla Norman  : 1965  MRN: [id number]  Referring provider: Lambert Hunt PA*  Dx:   Encounter Diagnosis     ICD-10-CM    1. Tear of medial meniscus of left knee, unspecified tear type, unspecified whether old or current tear, initial encounter  S83.242A       2. Acute pain of left knee  M25.562                                    Subjective: Patient reports minimal reduction in knee inflammation since starting the Meloxicam.  Notes that she is attempting to do more within pain free limits at home including stair navigation.     Objective: See treatment diary below    Assessment: Still ambulating with an axillary crutch per instability that occurs randomly.  No gait deviations with appropriate weight bearing and step lengths noted.  Difficulty with maintaining 50% BW with squats but is improving regarding depth.  2x occurence of instability descending steps.     Plan: Continue per plan of care.      Precautions:   Patient Active Problem List   Diagnosis    Internal derangement of left knee    GERD (gastroesophageal reflux disease)    Hypertension    PONV (postoperative nausea and vomiting)    Tear of medial meniscus of left knee, unspecified tear type, unspecified whether old or current tear, initial encounter     Visits 1x/wk per High copay    L knee arthroscopy for medial meniscus repair and lateral debridement on 10/10/2024    Manuals 10/16 10/23 10/30 11/6 11/13 11/20 11/27 12/4 12/11 12/18   L knee patellar mobs  G4 G4 G4 G4 G4 G4      L knee PROM  6' 6' 6' 6' 6' 6'      L knee scar mobs  2' 2' 2' 2' 2' 2'                   Neuro Re-Ed             Biodex weight shifts M/L    2' 2'        Biodex Maze motor control    Maze L2 x1 Maze L2 x1 Maze L2 x3 Maze L2 x3 Maze L2 x2  69% Maze L2 x2 Maze L2 x2   Biodex % weight bearing squats        2x10 2x10 2x10                                                       Ther Ex             Recumbent bike  "ROM    5' 5' 5' 5' 5' L1 5' L1 5'   Quad sets 10 x5\" 2x10 x5\" 2x10 x5\" 2x10 x5\" 2x10 x5\" 2x10 x5\" 2x10 x5\"      Heel slides w/ strap 2x10 x5\"  10 x10\" 10 x10\" 10 x10\" 10 x10\" 10 x10\"      SLR  Abd 2x10 Abd 2x10 2x10 ea.  2x10 2x10 chair 2x10 chair 2x10 chair 2x10 chair   LAQ -    2x10 3x10 3x10 3x10 3x10 3x10 3x10 2# 3x10 2#   Leg Press - 90 deg knee bend    25#  3x10  25# 3x10  35# 3x10     Mini squats    NV NV        Standing gastroc stretch    3x20\" on L  3x20\" 3x20\" 3x20\" 3x20\" 3x20\"   Standing heel and toe raises      3x10 3x10  3x10 3x10   Seated HS curls with TB - orange        3x10 3x10 3x10   HS curls on chair          4x10   Ther Activity             Gait training    1 crutch 5' 1 crutch 5'    3' 3'   Step ups 6\"         2x8 2x10, down 4\"                             Modalities                                            "

## 2024-12-24 ENCOUNTER — OFFICE VISIT (OUTPATIENT)
Dept: PHYSICAL THERAPY | Facility: CLINIC | Age: 59
End: 2024-12-24
Payer: COMMERCIAL

## 2024-12-24 DIAGNOSIS — M25.562 ACUTE PAIN OF LEFT KNEE: ICD-10-CM

## 2024-12-24 DIAGNOSIS — S83.242A TEAR OF MEDIAL MENISCUS OF LEFT KNEE, UNSPECIFIED TEAR TYPE, UNSPECIFIED WHETHER OLD OR CURRENT TEAR, INITIAL ENCOUNTER: Primary | ICD-10-CM

## 2024-12-24 PROCEDURE — 97530 THERAPEUTIC ACTIVITIES: CPT | Performed by: PHYSICAL THERAPIST

## 2024-12-24 PROCEDURE — 97110 THERAPEUTIC EXERCISES: CPT | Performed by: PHYSICAL THERAPIST

## 2024-12-24 PROCEDURE — 97112 NEUROMUSCULAR REEDUCATION: CPT | Performed by: PHYSICAL THERAPIST

## 2024-12-24 NOTE — PROGRESS NOTES
"Daily Note     Today's date: 2024  Patient name: Pricilla Norman  : 1965  MRN: [id number]  Referring provider: Lambert Hunt PA*  Dx:   Encounter Diagnosis     ICD-10-CM    1. Tear of medial meniscus of left knee, unspecified tear type, unspecified whether old or current tear, initial encounter  S83.242A       2. Acute pain of left knee  M25.562                                      Subjective: Patient states that she is making progress regarding less inflammation but notes some medial joint discomfort now that she is doing more.  Notes complete d/c of crutches but occasional giving out present.      Objective: See treatment diary below    Assessment: Moderate eccentric loss of control on 6\" step downs, but improved ability to ascend with less contralateral vault.  Quad activation continues to make improvement with ability to maintain knee TKE upon early lift with SLR.  Overall patient is tolerating progressions well.      Plan: Continue per plan of care.      Precautions:   Patient Active Problem List   Diagnosis    Internal derangement of left knee    GERD (gastroesophageal reflux disease)    Hypertension    PONV (postoperative nausea and vomiting)    Tear of medial meniscus of left knee, unspecified tear type, unspecified whether old or current tear, initial encounter     Visits 1x/wk per High copay    L knee arthroscopy for medial meniscus repair and lateral debridement on 10/10/2024    Manuals    L knee patellar mobs             L knee PROM             L knee scar mobs                          Neuro Re-Ed             Biodex weight shifts M/L             Biodex Maze motor control          Maze L2 x2   Biodex % weight bearing squats          2x10   Sharpened romberg on foam 3x20\" B/L            SLS w ball catch - self toss 2x10                                      Ther Ex             Recumbent bike ROM L1 5'         L1 5'   Quad sets             Heel slides w/ strap        " "     SLR 2x10 chair         2x10 chair   LAQ -  3x10 3#         3x10 2#   Leg Press - 90 deg knee bend 45# 3x10            Mini squats             Standing gastroc stretch 3x20\"         3x20\"   Standing heel and toe raises 2x10 single leg         3x10   Seated HS curls with TB - orange DC         3x10   HS curls on chair 4x20ft         4x10   Step ups - 6\" 2x10            Ther Activity             Gait training          3'   Step ups 6\"          2x10, down 4\"                             Modalities                                            "

## 2024-12-30 ENCOUNTER — TELEPHONE (OUTPATIENT)
Age: 59
End: 2024-12-30

## 2024-12-30 NOTE — TELEPHONE ENCOUNTER
Caller: Patient    Doctor: Liudmila    Reason for call: Patient states the Meloxicam hasn't helped w/her knee pain. The knee is still swollen. The knee is still giving out on her. She is still having pain on the outer side of her left knee. She is still attending PT. Pain is affecting her sleep. Had to take a pain pill for relief. Pain 8-9/10. Questioned what to do?    Call back#: [phone]

## 2024-12-30 NOTE — TELEPHONE ENCOUNTER
I asked her that question. She said PT recommended she contact Dr Nur. They haven't had her do anything out of the ordinary and they aren't sure why she is experiencing the pain/swelling. The patient and PT both thought the patient would be further along in her progress by now. Questioning Dr Nur's recommendations

## 2024-12-31 ENCOUNTER — OFFICE VISIT (OUTPATIENT)
Dept: PHYSICAL THERAPY | Facility: CLINIC | Age: 59
End: 2024-12-31
Payer: COMMERCIAL

## 2024-12-31 DIAGNOSIS — M25.562 ACUTE PAIN OF LEFT KNEE: ICD-10-CM

## 2024-12-31 DIAGNOSIS — S83.242A TEAR OF MEDIAL MENISCUS OF LEFT KNEE, UNSPECIFIED TEAR TYPE, UNSPECIFIED WHETHER OLD OR CURRENT TEAR, INITIAL ENCOUNTER: Primary | ICD-10-CM

## 2024-12-31 PROCEDURE — 97530 THERAPEUTIC ACTIVITIES: CPT | Performed by: PHYSICAL THERAPIST

## 2024-12-31 PROCEDURE — 97112 NEUROMUSCULAR REEDUCATION: CPT | Performed by: PHYSICAL THERAPIST

## 2024-12-31 PROCEDURE — 97110 THERAPEUTIC EXERCISES: CPT | Performed by: PHYSICAL THERAPIST

## 2024-12-31 NOTE — PROGRESS NOTES
"Daily Note     Today's date: 2024  Patient name: Pricilla Norman  : 1965  MRN: [id number]  Referring provider: Lambert Hunt PA*  Dx:   Encounter Diagnosis     ICD-10-CM    1. Tear of medial meniscus of left knee, unspecified tear type, unspecified whether old or current tear, initial encounter  S83.242A       2. Acute pain of left knee  M25.562                                        Subjective: Patient continues to note frustration with persistent swelling despite medication.  Notes that she experiences night pain requiring her to take pain medicine 2 nights prior due to the swelling.      Objective: See treatment diary below    Assessment: Despite the swelling, we are continuing to maintain and slowly advance strengthening exercises/knee demands within pain free limits.  SLS and dynamic stability are improving and less buckling events occur throughout treatment.  Overall patient is making slow functional progress.    Plan: Continue per plan of care.      Precautions:   Patient Active Problem List   Diagnosis    Internal derangement of left knee    GERD (gastroesophageal reflux disease)    Hypertension    PONV (postoperative nausea and vomiting)    Tear of medial meniscus of left knee, unspecified tear type, unspecified whether old or current tear, initial encounter     Visits 1x/wk per High copay    L knee arthroscopy for medial meniscus repair and lateral debridement on 10/10/2024    Manuals    L knee patellar mobs             L knee PROM             L knee scar mobs                          Neuro Re-Ed             Biodex weight shifts M/L             Biodex Maze motor control          Maze L2 x2   Biodex % weight bearing squats          2x10   Sharpened romberg on foam 3x20\" B/L 3x20\" B/L           SLS w ball catch - self toss 2x10 2x10                                     Ther Ex             Recumbent bike ROM L1 5' L1 5'        L1 5'   Quad sets             Heel " "slides w/ strap             SLR 2x10 chair 2x10 table        2x10 chair   LAQ -  3x10 3# 3x10 4#        3x10 2#   Leg Press - 90 deg knee bend - single leg 45# 3x10 35# 3x10           Mini squats             Standing gastroc stretch 3x20\" 3x20\"        3x20\"   Standing heel and toe raises 2x10 single leg 2x10 single leg        3x10   Seated HS curls with TB - orange DC         3x10   HS curls on chair 4x20ft 4x20 ft.        4x10   Step ups/down - 6\" 2x10 2x10           Ther Activity             Gait training          3'   Step ups 6\"          2x10, down 4\"                             Modalities                                            "

## 2024-12-31 NOTE — TELEPHONE ENCOUNTER
Caller: Pricilla Kay    Doctor: Liudmila    Reason for call: Patient would like to know if the Meloxicam can be increased? Can something be done for the swelling. Patient states the swelling is the worst part of it.  Please advise  Thank you    Call back#: [phone]

## 2025-01-08 ENCOUNTER — APPOINTMENT (OUTPATIENT)
Dept: PHYSICAL THERAPY | Facility: CLINIC | Age: 60
End: 2025-01-08
Payer: COMMERCIAL

## 2025-01-08 NOTE — PROGRESS NOTES
PT Evaluation     Today's date: 2025  Patient name: Pricilla Norman  : 1965  MRN: [id number]  Referring provider: Lambert Hunt PA*  Dx:   No diagnosis found.                 Assessment  Impairments: abnormal gait, abnormal or restricted ROM, abnormal movement, activity intolerance, impaired balance, impaired physical strength, lacks appropriate home exercise program, pain with function and weight-bearing intolerance  Impairments comments: on 10/10/    Assessment details: Patient is a 59 y.o. year old female who attended physical therapy for 13 treatment sessions s/p left knee arthroscopic repair regarding a complex tear posterior horn/root of the medial meniscus that was repaired and a complex tear of the posterior horn of the lateral meniscus on 10/10/2024. Patient reports X% improvement at this time which correlates to improved FOTO scoring.  Patient has shown improvement throughout PT by demonstrating decreased pain, increased range of motion, increased strength, improved tolerance to activity, and improved gait/balance.  Patient continues to present with pain, decreased ROM, decreased strength, and decreased tolerance to activity. Pricilla Kay would benefit from continued physical therapy to address these issues and to maximize function. Thank you.      Understanding of Dx/Px/POC: excellent     Prognosis: excellent    Goals  STG (6 weeks)  1. Patient will be independent with initial HEP (MET)  2. Decrease pain at worst by 2 points on NPRS (MET)  3. Increase left knee PROM to full and pain free (PARTIALLY MET)  4. Patient will demonstrate ability to ambulate with normal gait mechanics without AD (MET)  LTG (4-6 months)  1. Decrease pain at worst from 4 points on NPRS (PROGRESSING)  2. Increase single hop to within 85% of contralateral limb for resumption of dynamic hiking activities (PROGRESSING)  3. Increase left quad/hamstring strength equal to the right per MMT (PROGRESSING)  4. Patient will  demonstrate ability to perform dynamic SLS without pain or LOB (PROGRESSING)  5. Increase FOTO score > or equal to expected outcome (PROGRESSING)      Plan  Patient would benefit from: skilled PT    Planned therapy interventions: joint mobilization, manual therapy, patient education, neuromuscular re-education, strengthening, stretching, therapeutic activities, therapeutic exercise, transfer training, home exercise program, functional ROM exercises, balance/weight bearing training and ADL training    Frequency: 2x week  Duration in weeks: 8  Treatment plan discussed with: patient        Subjective Evaluation    History of Present Illness  Mechanism of injury: Patient reports to outpatient PT s/p arthroscopic repair regarding a left knee complex tear posterior horn/root of the medial meniscus that was REPAIRED and a complex tear of the posterior horn of the lateral meniscus that was debrided on 10/10/2024.  Dr. Nur educated patient on early mobilization and will be TTWB for 2-4 weeks.  Post-operatively, patient describes the pain as achy and sharp which is worse with movement of the leg and improved with rest and ice.  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  Patients goals for PT are to decrease the pain and return to PLOF.      2025: Patient is now 13 weeks post-operative.  States that she has been unable to resume normal work duties as a result of continued pain, swelling and instability.  Notes that her greatest impairments include weakness and instability preventing her from performing her normal job duties.            Recurrent probem    Quality of life: good    Patient Goals  Patient goals for therapy: increased strength, independence with ADLs/IADLs, return to sport/leisure activities, increased motion and decreased pain    Pain  Current pain ratin  At best pain ratin  At worst  pain ratin  Quality: dull ache, tight and sharp  Relieving factors: rest, relaxation, ice and medications (Tramadol for pain management)  Aggravating factors: standing and walking    Social Support  Steps to enter house: yes (2 w/ railing)  Stairs in house: no   Lives in: one-story house  Lives with: significant other    Employment status: working ([employer])    Diagnostic Tests  MRI studies: abnormal        Objective     Observations     Additional Observation Details  (+) 1+ pitting edema (RESOLVED but joint effusion remains present)    (+) high risk for DVT per Wells Criteria (RESOLVED)      Neurological Testing     Sensation     Knee   Left Knee   Intact: Light touch    Active Range of Motion   Left Knee   Flexion: 125 degrees   Extension: 0 degrees     Passive Range of Motion   Left Knee   Flexion: 130 degrees   Extension: 0 degrees     Mobility   Patellar Mobility:   Left Knee   WFL: medial, lateral, superior and inferior.     Strength/Myotome Testing     Left Knee   Flexion: 4-  Extension: 4-  Quadriceps contraction: fair    Right Knee   Flexion: 5  Extension: 5  Quadriceps contraction: good    Additional Strength Details  Hip abduction MMT: 3+ on L, 4 on R    Ambulation   Weight-Bearing Status     Additional Weight-Bearing Status Details  Gait assessment: Normal gait mechanics and gait speed without an AD    Stair navigation: Moderate contralateral vault ascending and eccentric loss while descending steps while using a rail given occasional buckling    5XSTS:              Precautions:   Patient Active Problem List   Diagnosis    Internal derangement of left knee    GERD (gastroesophageal reflux disease)    Hypertension    PONV (postoperative nausea and vomiting)    Tear of medial meniscus of left knee, unspecified tear type, unspecified whether old or current tear, initial encounter     TTWB x4 weeks  ROM as tolerated  Visits 1x/wk per High copay      Manuals    L knee patellar  "mobs             L knee PROM             L knee scar mobs             Re-eval   15'          Neuro Re-Ed             Biodex weight shifts M/L             Biodex Maze motor control          Maze L2 x2   Biodex % weight bearing squats          2x10   Sharpened romberg on foam 3x20\" B/L 3x20\" B/L 3x20\" B/L          SLS w ball catch - self toss 2x10 2x10 2x10                                    Ther Ex             Recumbent bike ROM L1 5' L1 5' L2 5'       L1 5'   Quad sets             Heel slides w/ strap             SLR 2x10 chair 2x10 table 2x10 table       2x10 chair   LAQ -  3x10 3# 3x10 4# 3x10 4#       3x10 2#   Leg Press - 90 deg knee bend - single leg 45# 3x10 35# 3x10 35# 3x10          Mini squats             Standing gastroc stretch 3x20\" 3x20\" 3x20\"       3x20\"   Standing heel and toe raises 2x10 single leg 2x10 single leg 2x10 single leg       3x10   Seated HS curls with TB - orange DC         3x10   HS curls on chair 4x20ft 4x20 ft. 4x20 ft.       4x10   Step ups/down - 6\" 2x10 2x10 FF x2          Ther Activity             Gait training          3'   Step ups 6\"          2x10, down 4\"                             Modalities                                              "

## 2025-01-10 ENCOUNTER — OFFICE VISIT (OUTPATIENT)
Dept: OBGYN CLINIC | Facility: CLINIC | Age: 60
End: 2025-01-10
Payer: COMMERCIAL

## 2025-01-10 VITALS — HEIGHT: 61 IN | BODY MASS INDEX: 33.23 KG/M2 | WEIGHT: 176 LBS

## 2025-01-10 DIAGNOSIS — Z98.890 STATUS POST MEDIAL MENISCUS REPAIR: Primary | ICD-10-CM

## 2025-01-10 PROCEDURE — 99214 OFFICE O/P EST MOD 30 MIN: CPT | Performed by: ORTHOPAEDIC SURGERY

## 2025-01-10 PROCEDURE — 20610 DRAIN/INJ JOINT/BURSA W/O US: CPT

## 2025-01-10 RX ORDER — CELECOXIB 100 MG/1
100 CAPSULE ORAL 2 TIMES DAILY
Qty: 60 CAPSULE | Refills: 1 | Status: SHIPPED | OUTPATIENT
Start: 2025-01-10

## 2025-01-10 RX ADMIN — TRIAMCINOLONE ACETONIDE 80 MG: 40 INJECTION, SUSPENSION INTRA-ARTICULAR; INTRAMUSCULAR at 11:15

## 2025-01-10 RX ADMIN — LIDOCAINE HYDROCHLORIDE 3 ML: 10 INJECTION, SOLUTION INFILTRATION; PERINEURAL at 11:15
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
## 2025-01-15 ENCOUNTER — OFFICE VISIT (OUTPATIENT)
Dept: PHYSICAL THERAPY | Facility: CLINIC | Age: 60
End: 2025-01-15
Payer: COMMERCIAL

## 2025-01-15 DIAGNOSIS — M25.562 ACUTE PAIN OF LEFT KNEE: Primary | ICD-10-CM

## 2025-01-15 PROCEDURE — 97140 MANUAL THERAPY 1/> REGIONS: CPT | Performed by: PHYSICAL THERAPIST

## 2025-01-15 PROCEDURE — 97110 THERAPEUTIC EXERCISES: CPT | Performed by: PHYSICAL THERAPIST

## 2025-01-15 NOTE — PROGRESS NOTES
PT Evaluation     Today's date: 1/15/2025  Patient name: Pricilla Norman  : 1965  MRN: [id number]  Referring provider: Lambert Hunt PA*  Dx:   Encounter Diagnosis     ICD-10-CM    1. Acute pain of left knee  M25.562 Ambulatory referral to Physical Therapy                       Assessment  Impairments: abnormal gait, abnormal or restricted ROM, abnormal movement, activity intolerance, impaired balance, impaired physical strength, lacks appropriate home exercise program, pain with function and weight-bearing intolerance    Assessment details: Patient is a 59 y.o. year old female who attended physical therapy for 13 treatment sessions s/p left knee arthroscopic repair regarding a complex tear posterior horn/root of the medial meniscus that was repaired and a complex tear of the posterior horn of the lateral meniscus on 10/10/2024. Patient reports moderate improvement at this time which correlates to improved FOTO scoring.  Patient has shown improvement throughout PT by demonstrating decreased pain, increased range of motion, increased strength, improved tolerance to activity, and improved gait/balance.  Patient continues to present with pain, decreased ROM, decreased strength, and decreased tolerance to activity. Pricilla Kay would benefit from continued physical therapy to address these issues and to maximize function. Thank you.      Understanding of Dx/Px/POC: excellent     Prognosis: excellent    Goals  STG (6 weeks)  1. Patient will be independent with initial HEP (MET)  2. Decrease pain at worst by 2 points on NPRS (MET)  3. Increase left knee PROM to full and pain free (PARTIALLY MET)  4. Patient will demonstrate ability to ambulate with normal gait mechanics without AD (MET)  LTG (4-6 months)  1. Decrease pain at worst from 4 points on NPRS (PROGRESSING)  2. Increase single hop to within 85% of contralateral limb for resumption of dynamic hiking activities (PROGRESSING)  3. Increase left  quad/hamstring strength equal to the right per MMT (PROGRESSING)  4. Patient will demonstrate ability to perform dynamic SLS without pain or LOB (PROGRESSING)  5. Increase FOTO score > or equal to expected outcome (PROGRESSING)      Plan  Patient would benefit from: skilled PT    Planned therapy interventions: joint mobilization, manual therapy, patient education, neuromuscular re-education, strengthening, stretching, therapeutic activities, therapeutic exercise, transfer training, home exercise program, functional ROM exercises, balance/weight bearing training and ADL training    Frequency: 2x week  Duration in weeks: 8  Treatment plan discussed with: patient        Subjective Evaluation    History of Present Illness  Mechanism of injury: Patient reports to outpatient PT s/p arthroscopic repair regarding a left knee complex tear posterior horn/root of the medial meniscus that was REPAIRED and a complex tear of the posterior horn of the lateral meniscus that was debrided on 10/10/2024.  Dr. Nur educated patient on early mobilization and will be TTWB for 2-4 weeks.  Post-operatively, patient describes the pain as achy and sharp which is worse with movement of the leg and improved with rest and ice.  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  Patients goals for PT are to decrease the pain and return to PLOF.      1/15/2025: Patient is now 14 weeks post-operative.  States that she has been unable to resume normal work duties as a result of continued pain, swelling and instability.  The knee was drained of fluid last week and patient does note improved mobility with less discomfort since.  Notes that her greatest impairments include weakness and instability preventing her from performing her normal job duties.            Recurrent probem    Quality of life: good    Patient Goals  Patient goals for therapy:  increased strength, independence with ADLs/IADLs, return to sport/leisure activities, increased motion and decreased pain    Pain  Current pain ratin  At best pain ratin  At worst pain ratin  Quality: dull ache, tight and sharp  Relieving factors: rest, relaxation, ice and medications (Tramadol for pain management)  Aggravating factors: standing and walking (rest at night)    Social Support  Steps to enter house: yes (2 w/ railing)  Stairs in house: no   Lives in: one-story house  Lives with: significant other    Employment status: working ([employer])    Diagnostic Tests  MRI studies: abnormal        Objective     Observations     Additional Observation Details  (+) 1+ pitting edema (RESOLVED but joint effusion remains present)    (+) high risk for DVT per [place] Criteria (RESOLVED)      Neurological Testing     Sensation     Knee   Left Knee   Intact: Light touch    Active Range of Motion   Left Knee   Flexion: 125 degrees   Extension: 0 degrees     Passive Range of Motion   Left Knee   Flexion: 130 degrees   Extension: 0 degrees     Mobility   Patellar Mobility:   Left Knee   WFL: medial, lateral, superior and inferior.     Strength/Myotome Testing     Left Knee   Flexion: 4-  Extension: 4-  Quadriceps contraction: fair    Right Knee   Flexion: 5  Extension: 5  Quadriceps contraction: good    Additional Strength Details  Hip abduction MMT: 3+ on L, 4 on R    L hamstring make testin-/5    Ambulation   Weight-Bearing Status     Additional Weight-Bearing Status Details  Gait assessment: Normal gait mechanics and gait speed without an AD    Stair navigation: Moderate contralateral vault ascending and eccentric loss while descending steps while using a rail given occasional buckling    5XSTS: 9.70 seconds without UE push off             Precautions:   Patient Active Problem List   Diagnosis    Internal derangement of left knee    GERD (gastroesophageal reflux disease)    Hypertension    PONV (postoperative  "nausea and vomiting)    Tear of medial meniscus of left knee, unspecified tear type, unspecified whether old or current tear, initial encounter     TTWB x4 weeks  ROM as tolerated  Visits 1x/wk per High copay      Manuals 12/24 12/31 1/15       12/18   L knee patellar mobs             L knee PROM             L knee scar mobs             Re-eval   25'          Neuro Re-Ed             Biodex weight shifts M/L             Biodex Maze motor control          Maze L2 x2   Biodex % weight bearing squats          2x10   Sharpened romberg on foam 3x20\" B/L 3x20\" B/L 3x20\" B/L          SLS w ball catch - self toss 2x10 2x10 2x10                                    Ther Ex             Recumbent bike ROM L1 5' L1 5' L2 5'       L1 5'   Quad sets             Heel slides w/ strap             SLR 2x10 chair 2x10 table        2x10 chair   LAQ -  3x10 3# 3x10 4#        3x10 2#   Leg Press - 90 deg knee bend - single leg 45# 3x10 35# 3x10           Mini squats             Standing gastroc stretch 3x20\" 3x20\"        3x20\"   Standing heel and toe raises 2x10 single leg 2x10 single leg 2x10 single leg       3x10   Seated HS curls with TB - orange DC         3x10   HS curls on chair 4x20ft 4x20 ft.        4x10   Step ups/down - 6\" 2x10 2x10           Ther Activity             Gait training          3'   Step ups 6\"          2x10, down 4\"   Lifting mechanics review for work sim.   15# x2          Dependent transfers   NV          Modalities                                              "

## 2025-01-16 RX ORDER — TRIAMCINOLONE ACETONIDE 40 MG/ML
80 INJECTION, SUSPENSION INTRA-ARTICULAR; INTRAMUSCULAR
Status: COMPLETED | OUTPATIENT
Start: 2025-01-10 | End: 2025-01-10

## 2025-01-16 RX ORDER — LIDOCAINE HYDROCHLORIDE 10 MG/ML
3 INJECTION, SOLUTION INFILTRATION; PERINEURAL
Status: COMPLETED | OUTPATIENT
Start: 2025-01-10 | End: 2025-01-10

## 2025-01-22 ENCOUNTER — OFFICE VISIT (OUTPATIENT)
Dept: PHYSICAL THERAPY | Facility: CLINIC | Age: 60
End: 2025-01-22
Payer: COMMERCIAL

## 2025-01-22 DIAGNOSIS — S83.242A TEAR OF MEDIAL MENISCUS OF LEFT KNEE, UNSPECIFIED TEAR TYPE, UNSPECIFIED WHETHER OLD OR CURRENT TEAR, INITIAL ENCOUNTER: ICD-10-CM

## 2025-01-22 DIAGNOSIS — M25.562 ACUTE PAIN OF LEFT KNEE: Primary | ICD-10-CM

## 2025-01-22 PROCEDURE — 97112 NEUROMUSCULAR REEDUCATION: CPT | Performed by: PHYSICAL THERAPIST

## 2025-01-22 PROCEDURE — 97530 THERAPEUTIC ACTIVITIES: CPT | Performed by: PHYSICAL THERAPIST

## 2025-01-22 PROCEDURE — 97110 THERAPEUTIC EXERCISES: CPT | Performed by: PHYSICAL THERAPIST

## 2025-01-22 NOTE — PROGRESS NOTES
"Daily Note     Today's date: 2025  Patient name: Pricilla Norman  : 1965  MRN: [id number]  Referring provider: Lambert Hunt PA*  Dx:   Encounter Diagnosis     ICD-10-CM    1. Acute pain of left knee  M25.562       2. Tear of medial meniscus of left knee, unspecified tear type, unspecified whether old or current tear, initial encounter  S83.242A                      Subjective: Patient reports some soreness today from moving out of her old house and going up/down the steps a lot.  Does note that the knee tolerated this activity better than she expected.       Objective: See treatment diary below      Assessment: Patient demonstrates significant improvement in LE strength and functional mobility.  Reviewed dependent transfers and approximating objects/the patient to limit knee stress which was tolerated well and encouraged to make smaller steps to avoid knee rotation.        Plan: Continue per plan of care.      Precautions:   Patient Active Problem List   Diagnosis    Internal derangement of left knee    GERD (gastroesophageal reflux disease)    Hypertension    PONV (postoperative nausea and vomiting)    Tear of medial meniscus of left knee, unspecified tear type, unspecified whether old or current tear, initial encounter     TTWB x4 weeks  ROM as tolerated  Visits 1x/wk per High copay      Manuals 12/24 12/31 1/15 1/22      12/18   L knee patellar mobs             L knee PROM             L knee scar mobs             Re-eval   25'          Neuro Re-Ed             Biodex weight shifts M/L             Biodex Maze motor control          Maze L2 x2   Biodex % weight bearing squats          2x10   Sharpened romberg on foam 3x20\" B/L 3x20\" B/L 3x20\" B/L 3x20\" B/L         SLS w ball catch - self toss on foam 2x10 2x10 2x10 2x10                                   Ther Ex             Recumbent bike ROM L1 5' L1 5' L2 5' L2 5'      L1 5'   Quad sets             Heel slides w/ strap             SLR in " "supine 2x10 chair 2x10 table  3x10       2x10 chair   LAQ -  3x10 3# 3x10 4#  3x10 5#      3x10 2#   Leg Press - 90 deg knee bend - single leg 45# 3x10 35# 3x10  45# 3x10         Mini squats             Standing gastroc stretch 3x20\" 3x20\"        3x20\"   Standing heel and toe raises 2x10 single leg 2x10 single leg 2x10 single leg 3x10 single leg      3x10   Seated HS curls with TB - orange DC         3x10   HS curls on chair 4x20ft 4x20 ft.  4x20 ft.      4x10   Step ups/down - 6\" 2x10 2x10           Ther Activity             Gait training          3'   Step ups 6\"          2x10, down 4\"   Lifting mechanics review for work sim.   15# x2          Dependent transfers   NV Reviewed (15')         Modalities                                              "

## 2025-01-24 ENCOUNTER — TELEPHONE (OUTPATIENT)
Dept: OBGYN CLINIC | Facility: HOSPITAL | Age: 60
End: 2025-01-24

## 2025-01-29 ENCOUNTER — APPOINTMENT (OUTPATIENT)
Dept: PHYSICAL THERAPY | Facility: CLINIC | Age: 60
End: 2025-01-29
Payer: COMMERCIAL

## 2025-02-05 ENCOUNTER — OFFICE VISIT (OUTPATIENT)
Dept: PHYSICAL THERAPY | Facility: CLINIC | Age: 60
End: 2025-02-05
Payer: COMMERCIAL

## 2025-02-05 DIAGNOSIS — S83.242A TEAR OF MEDIAL MENISCUS OF LEFT KNEE, UNSPECIFIED TEAR TYPE, UNSPECIFIED WHETHER OLD OR CURRENT TEAR, INITIAL ENCOUNTER: ICD-10-CM

## 2025-02-05 DIAGNOSIS — M25.562 ACUTE PAIN OF LEFT KNEE: Primary | ICD-10-CM

## 2025-02-05 PROCEDURE — 97110 THERAPEUTIC EXERCISES: CPT | Performed by: PHYSICAL THERAPIST

## 2025-02-05 PROCEDURE — 97112 NEUROMUSCULAR REEDUCATION: CPT | Performed by: PHYSICAL THERAPIST

## 2025-02-05 NOTE — PROGRESS NOTES
"Daily Note     Today's date: 2025  Patient name: Pricilla Norman  : 1965  MRN: [id number]  Referring provider: Lambert Hunt PA*  Dx:   Encounter Diagnosis     ICD-10-CM    1. Acute pain of left knee  M25.562       2. Tear of medial meniscus of left knee, unspecified tear type, unspecified whether old or current tear, initial encounter  S83.242A                      Subjective: Patient reports being sick with COVID over the past week.  States that the knee swelling remains low but the knee has been achy.       Objective: See treatment diary below      Assessment: Despite 2 weeks of not having PT, patient still able to make strength gains and progress noted.  Advancing single leg stability with fair tolerance to foam step ups and increased stability noted with increased reps. Overall patient is making steady progress toward goals.      Plan: Continue per plan of care.      Precautions:   Patient Active Problem List   Diagnosis    Internal derangement of left knee    GERD (gastroesophageal reflux disease)    Hypertension    PONV (postoperative nausea and vomiting)    Tear of medial meniscus of left knee, unspecified tear type, unspecified whether old or current tear, initial encounter     Visits 1x/wk per High copay      Manuals 12/24 12/31 1/15 1/22 2/5     12/18   L knee patellar mobs             L knee PROM             L knee scar mobs             Re-eval   25'          Neuro Re-Ed             Sharpened romberg on foam 3x20\" B/L 3x20\" B/L 3x20\" B/L 3x20\" B/L 3x20\" B/L        SLS w ball catch - self toss on foam 2x10 2x10 2x10 2x10         Single leg foam step ups     2x10 x2\"        Rockerboard M/L stability     3x20\"        Ther Ex             Recumbent bike ROM L1 5' L1 5' L2 5' L2 5' L2 5' upright     L1 5'   Quad sets             Heel slides w/ strap             SLR in supine 2x10 chair 2x10 table  3x10  3x10     2x10 chair   LAQ -  3x10 3# 3x10 4#  3x10 5# 5# 3x15     3x10 2#   Leg Press - " "90 deg knee bend - single leg 45# 3x10 35# 3x10  45# 3x10 55# 3x10        Squats to chair     2x5        Standing gastroc stretch 3x20\" 3x20\"        3x20\"   Standing heel and toe raises 2x10 single leg 2x10 single leg 2x10 single leg 3x10 single leg 3x10 single leg     3x10   Seated HS curls with TB - pink DC    3x10     3x10   HS curls on chair 4x20ft 4x20 ft.  4x20 ft. 4x20 ft.     4x10   Step ups/down - 6\" 2x10 2x10           Ther Activity             Gait training          3'   Step ups 6\"          2x10, down 4\"   Lifting mechanics review for work sim.   15# x2          Dependent transfers   NV Reviewed (15')         Modalities                                              "

## 2025-02-12 ENCOUNTER — OFFICE VISIT (OUTPATIENT)
Dept: PHYSICAL THERAPY | Facility: CLINIC | Age: 60
End: 2025-02-12
Payer: COMMERCIAL

## 2025-02-12 DIAGNOSIS — S83.242A TEAR OF MEDIAL MENISCUS OF LEFT KNEE, UNSPECIFIED TEAR TYPE, UNSPECIFIED WHETHER OLD OR CURRENT TEAR, INITIAL ENCOUNTER: ICD-10-CM

## 2025-02-12 DIAGNOSIS — M25.562 ACUTE PAIN OF LEFT KNEE: Primary | ICD-10-CM

## 2025-02-12 PROCEDURE — 97112 NEUROMUSCULAR REEDUCATION: CPT | Performed by: PHYSICAL THERAPIST

## 2025-02-12 PROCEDURE — 97110 THERAPEUTIC EXERCISES: CPT | Performed by: PHYSICAL THERAPIST

## 2025-02-12 NOTE — PROGRESS NOTES
"Daily Note     Today's date: 2025  Patient name: Pricilla Norman  : 1965  MRN: [id number]  Referring provider: Lambert Hunt PA*  Dx:   Encounter Diagnosis     ICD-10-CM    1. Acute pain of left knee  M25.562       2. Tear of medial meniscus of left knee, unspecified tear type, unspecified whether old or current tear, initial encounter  S83.242A                        Subjective: Patient reports mild soreness and fluctuating inflammation but notes ability to perform most ADL's without discomfort. Does note the knee gave out twice with walking this past week.      Objective: See treatment diary below      Assessment: Patient making steady improvement in strength, neuromuscular and functional progressions.  Challenged by wall squats per weakness and updated this to her HEP.  Began neuromuscular stability with dynamic movement for rotary control/stability and was tolerated well.  No evidence of knee giving way throughout treatment.       Plan: Continue per plan of care.      Precautions:   Patient Active Problem List   Diagnosis    Internal derangement of left knee    GERD (gastroesophageal reflux disease)    Hypertension    PONV (postoperative nausea and vomiting)    Tear of medial meniscus of left knee, unspecified tear type, unspecified whether old or current tear, initial encounter     Visits 1x/wk per High copay      Manuals 12/24 12/31 1/15 1/22 2/5 2/12    12/18   L knee patellar mobs             L knee PROM             L knee scar mobs             Re-eval   25'          Neuro Re-Ed             Sharpened romberg on foam 3x20\" B/L 3x20\" B/L 3x20\" B/L 3x20\" B/L 3x20\" B/L 3x20\" B/L       SLS w ball catch - self toss on foam 2x10 2x10 2x10 2x10         Single leg foam step ups     2x10 x2\" 2x10 x2\"       Rockerboard M/L stability     3x20\" 3x20\"       Single leg y      X5 reps       Ther Ex             Recumbent bike ROM L1 5' L1 5' L2 5' L2 5' L2 5' upright L2 5' upright    L1 5'   Quad sets   " "          Heel slides w/ strap             SLR in supine 2x10 chair 2x10 table  3x10  3x10     2x10 chair   LAQ -  3x10 3# 3x10 4#  3x10 5# 5# 3x15 7.5# 3x10    3x10 2#   Leg Press - 90 deg knee bend - single leg 45# 3x10 35# 3x10  45# 3x10 55# 3x10 55# 3x10       Squats to chair     2x5 2x5 5#       Standing gastroc stretch 3x20\" 3x20\"        3x20\"   Standing heel and toe raises 2x10 single leg 2x10 single leg 2x10 single leg 3x10 single leg 3x10 single leg 3x10 single leg    3x10   Seated HS curls with TB - green DC    3x10 3x10    3x10   HS curls on chair 4x20ft 4x20 ft.  4x20 ft. 4x20 ft. 4x20ft.    4x10   Step ups/down - 6\" 2x10 2x10           Wall squats      2x10       Ther Activity             Gait training          3'   Step ups 6\"          2x10, down 4\"   Lifting mechanics review for work sim.   15# x2          Dependent transfers   NV Reviewed (15')         Modalities                                              "

## 2025-02-19 ENCOUNTER — OFFICE VISIT (OUTPATIENT)
Dept: PHYSICAL THERAPY | Facility: CLINIC | Age: 60
End: 2025-02-19
Payer: COMMERCIAL

## 2025-02-19 DIAGNOSIS — S83.242A TEAR OF MEDIAL MENISCUS OF LEFT KNEE, UNSPECIFIED TEAR TYPE, UNSPECIFIED WHETHER OLD OR CURRENT TEAR, INITIAL ENCOUNTER: ICD-10-CM

## 2025-02-19 DIAGNOSIS — M25.562 ACUTE PAIN OF LEFT KNEE: Primary | ICD-10-CM

## 2025-02-19 PROCEDURE — 97112 NEUROMUSCULAR REEDUCATION: CPT | Performed by: PHYSICAL THERAPIST

## 2025-02-19 PROCEDURE — 97110 THERAPEUTIC EXERCISES: CPT | Performed by: PHYSICAL THERAPIST

## 2025-02-19 NOTE — PROGRESS NOTES
"Daily Note     Today's date: 2025  Patient name: Pricilla Norman  : 1965  MRN: [id number]  Referring provider: Lambert Hunt PA*  Dx:   Encounter Diagnosis     ICD-10-CM    1. Acute pain of left knee  M25.562       2. Tear of medial meniscus of left knee, unspecified tear type, unspecified whether old or current tear, initial encounter  S83.242A                          Subjective: Patient reports sharp medial knee pain that began a few days ago without trauma.  Notes that she has been moving from her old home and performing a lot of steps.      Objective: See treatment diary below    (-) medial joint line tenderness  (+) tenderness of distal femur near pes anserine bursa and VMO tendon    Assessment: No real tenderness of the medial meniscus of medial joint line.  Patient demonstrates a mild antalgic gait with quick knee extension in stance for stability.  Focused on improving quad contributions with step ups on foam to limited early extension compensation with fair tolerance.  Overall patient demonstrated good tolerance to exercise progressions without occurrence of instability.       Plan: Continue per plan of care.      Precautions:   Patient Active Problem List   Diagnosis    Internal derangement of left knee    GERD (gastroesophageal reflux disease)    Hypertension    PONV (postoperative nausea and vomiting)    Tear of medial meniscus of left knee, unspecified tear type, unspecified whether old or current tear, initial encounter     Visits 1x/wk per High copay      Manuals 12/24 12/31 1/15 1/22 2/5 2/12 2/19      L knee patellar mobs             L knee PROM             L knee scar mobs             Re-eval   25'          Neuro Re-Ed             Sharpened romberg on foam 3x20\" B/L 3x20\" B/L 3x20\" B/L 3x20\" B/L 3x20\" B/L 3x20\" B/L 3x20\" B/L      SLS w ball catch - self toss on foam 2x10 2x10 2x10 2x10         Single leg foam step ups     2x10 x2\" 2x10 x2\" 2x10 x2\"      Rockerboard M/L " "stability     3x20\" 3x20\" 2x5 mini squat      Single leg y      X5 reps X5 reps      Ther Ex             Recumbent bike ROM L1 5' L1 5' L2 5' L2 5' L2 5' upright L2 5' upright L2 5' upright      Quad sets             Heel slides w/ strap             SLR in supine 2x10 chair 2x10 table  3x10  3x10        LAQ -  3x10 3# 3x10 4#  3x10 5# 5# 3x15 7.5# 3x10       Leg Press - 90 deg knee bend - single leg 45# 3x10 35# 3x10  45# 3x10 55# 3x10 55# 3x10 55# 3x10      Squats to chair     2x5 2x5 5#       Standing gastroc stretch 3x20\" 3x20\"           Standing heel and toe raises 2x10 single leg 2x10 single leg 2x10 single leg 3x10 single leg 3x10 single leg 3x10 single leg 3x10 single leg      Seated HS curls with TB - green DC    3x10 3x10 3x10      HS curls on chair 4x20ft 4x20 ft.  4x20 ft. 4x20 ft. 4x20ft.       Step ups/down - 6\" 2x10 2x10           Wall squats      2x10 2x10      Ther Activity             Gait training             Step ups 6\"             Lifting mechanics review for work sim.   15# x2          Dependent transfers   NV Reviewed (15')         Modalities                                              "

## 2025-02-26 ENCOUNTER — OFFICE VISIT (OUTPATIENT)
Dept: PHYSICAL THERAPY | Facility: CLINIC | Age: 60
End: 2025-02-26
Payer: COMMERCIAL

## 2025-02-26 DIAGNOSIS — M25.562 ACUTE PAIN OF LEFT KNEE: Primary | ICD-10-CM

## 2025-02-26 DIAGNOSIS — S83.242A TEAR OF MEDIAL MENISCUS OF LEFT KNEE, UNSPECIFIED TEAR TYPE, UNSPECIFIED WHETHER OLD OR CURRENT TEAR, INITIAL ENCOUNTER: ICD-10-CM

## 2025-02-26 PROCEDURE — 97112 NEUROMUSCULAR REEDUCATION: CPT | Performed by: PHYSICAL THERAPIST

## 2025-02-26 PROCEDURE — 97110 THERAPEUTIC EXERCISES: CPT | Performed by: PHYSICAL THERAPIST

## 2025-02-26 NOTE — PROGRESS NOTES
"Daily Note     Today's date: 2025  Patient name: Pricilla Norman  : 1965  MRN: [id number]  Referring provider: Lambert Hunt PA*  Dx:   Encounter Diagnosis     ICD-10-CM    1. Acute pain of left knee  M25.562       2. Tear of medial meniscus of left knee, unspecified tear type, unspecified whether old or current tear, initial encounter  S83.242A                            Subjective: Patient reports onset of left hip pain in the lateral compartment upon awakening 2 days prior with difficulty ambulating.  States that the left medial knee pain is improving and has been since last visit.      Objective: See treatment diary below      Assessment: Patient demonstrates steady improvement in motor control per ability to perform squats on unable platform with equal weight bearing.  HS and quad strength continue to improve overall.  Continue to discuss modifications and easier methods of lifting for return to work.       Plan: Continue per plan of care.      Precautions:   Patient Active Problem List   Diagnosis    Internal derangement of left knee    GERD (gastroesophageal reflux disease)    Hypertension    PONV (postoperative nausea and vomiting)    Tear of medial meniscus of left knee, unspecified tear type, unspecified whether old or current tear, initial encounter     Visits 1x/wk per High copay      Manuals 12/24 12/31 1/15 1/22 2/5 2/12 2/19 2/26     L knee patellar mobs             L knee PROM             L knee scar mobs             Re-eval   25'          Neuro Re-Ed             Sharpened romberg on foam 3x20\" B/L 3x20\" B/L 3x20\" B/L 3x20\" B/L 3x20\" B/L 3x20\" B/L 3x20\" B/L 3x20\" B/L     SLS w ball catch - self toss on foam 2x10 2x10 2x10 2x10         Single leg foam step ups     2x10 x2\" 2x10 x2\" 2x10 x2\" 2x10 x2\"     Rockerboard M/L stability     3x20\" 3x20\" 2x5 mini squat 2x5 mini squats     Single leg y      X5 reps X5 reps      Ther Ex             Recumbent bike ROM L1 5' L1 5' L2 5' L2 5' " "L2 5' upright L2 5' upright L2 5' upright L2 5' upright     Quad sets             Heel slides w/ strap             SLR in supine 2x10 chair 2x10 table  3x10  3x10        LAQ -  3x10 3# 3x10 4#  3x10 5# 5# 3x15 7.5# 3x10  7.5# 3x10     Leg Press - 90 deg knee bend - single leg 45# 3x10 35# 3x10  45# 3x10 55# 3x10 55# 3x10 55# 3x10 55# 3x10     Squats to chair     2x5 2x5 5#       Standing gastroc stretch 3x20\" 3x20\"           Standing heel and toe raises 2x10 single leg 2x10 single leg 2x10 single leg 3x10 single leg 3x10 single leg 3x10 single leg 3x10 single leg 3x10 single leg     Seated HS curls with TB - green DC    3x10 3x10 3x10 3x10     HS curls on chair 4x20ft 4x20 ft.  4x20 ft. 4x20 ft. 4x20ft.       Step ups/down - 6\" 2x10 2x10           Wall squats      2x10 2x10      Ther Activity             Gait training             Step ups 6\"             Lifting mechanics review for work sim.   15# x2          Dependent transfers   NV Reviewed (15')         Modalities                                              "

## 2025-02-28 ENCOUNTER — OFFICE VISIT (OUTPATIENT)
Dept: OBGYN CLINIC | Facility: CLINIC | Age: 60
End: 2025-02-28
Payer: COMMERCIAL

## 2025-02-28 VITALS — HEIGHT: 61 IN | WEIGHT: 171 LBS | BODY MASS INDEX: 32.28 KG/M2

## 2025-02-28 DIAGNOSIS — Z98.890 STATUS POST MEDIAL MENISCUS REPAIR: Primary | ICD-10-CM

## 2025-02-28 DIAGNOSIS — M70.62 GREATER TROCHANTERIC BURSITIS OF LEFT HIP: ICD-10-CM

## 2025-02-28 PROCEDURE — 99213 OFFICE O/P EST LOW 20 MIN: CPT | Performed by: ORTHOPAEDIC SURGERY

## 2025-02-28 NOTE — PROGRESS NOTES
Subjective   CHIEF COMPLAINT/REASON FOR VISIT  Pricilla Norman is a 59 y.o. female who presents for 4.5 post op follow-up after Knee arthroscopy for medial meniscus repair and  lateral debridement - Left on 10/10/2024     HISTORY OF PRESENT ILLNESS  Patient is a f/u for 4.5 months s/p above procedure. Patient states since we saw her last, her pain has mostly improved with the aspiration injection. She does note pain superior to the region where her surgery was and that her pain feels different. No other orthopedic conditions or concerns addressed.     Objective   PHYSICAL EXAMINATION  Musculoskeletal: left Knee Examination:  General: The patient is alert, oriented, and pleasant to interact with.  Patient ambulates with Normal gait pattern  Assistive Device: No  Skin is warm and dry to touch with no signs of erythema, ecchymosis, or infection   Surgical incisions well healed  Effusion: minimal  ROM: 0° - 125°   TTP: Medial joint line  Flexor and extensor mechanisms are intact   Knee is stable to varus and valgus stress  Pradip's Test Positive Medial   Lachman's Test - 1A  Neurovascular intact distally  Sensation light touch intact distally  Quadriceps atrophy     Assessment & Plan   1. Status Post Knee arthroscopy for medial meniscus repair and  lateral debridement - Left   DOS: 10/10/24    Patients describes new left knee pain superior to the region where her repair took place. It appears that her area bothering her is soft tissue in nature and/or nerve related. No signs of failure from her surgical repair. It does appear that she may have overdone it and has experienced a setback  Continue PT/HEP as directed   Work note provided today  Recommended RICE and anti-inflammatories. Celebrex   Voltaren gel recommended   Follow up PRN  If any issues, questions, or concerns arise between now and the next appointment, we have encouraged the patient contact our team.    2. Greater trochanteric bursitis left hip    She  describes previous incidient during her recovery of lateral hip pain and TTP over trochanteric flare  She has no pain today and therefore we will monitor it for now. No additional treatments necessary.      Scribe Attestation      I,:  Umesh Caceres PA-C am acting as a scribe while in the presence of the attending physician.:       I,:  Martínez Nur MD personally performed the services described in this documentation    as scribed in my presence.:

## 2025-02-28 NOTE — LETTER
February 28, 2025     Patient: Pricilla Norman  YOB: 1965  Date of Visit: 2/28/2025      To Whom it May Concern:    Pricilla Norman is under my professional care. Pricilla Kay was seen in my office on 2/28/2025. Pricilla Kay is cleared to return to work on 3/17/25 with restrictions that include, no more than 6 hour shifts beginning at 3 days a week and increasing slowly to her normal 12 hour shifts in 4 weeks. At that time, she will be cleared to return to work without restriction. Thank you.    If you have any questions or concerns, please don't hesitate to call.         Sincerely,          Martínez Nur MD

## 2025-02-28 NOTE — LETTER
February 28, 2025     Patient: Pricilla Norman  YOB: 1965  Date of Visit: 2/28/2025      To Whom it May Concern:    Pricilla Norman is under my professional care. Pricilla Kay was seen in my office on 2/28/2025. Pricilla Kay is cleared to return to work on 3/17/25 with restrictions that include, no more than 6 hour shifts, increasing slowly to her normal 12 hour shifts in 4 weeks. At that time, she will be cleared to return to work without restriction. Thank you.     If you have any questions or concerns, please don't hesitate to call.         Sincerely,          Martínez Nur MD

## 2025-03-05 ENCOUNTER — OFFICE VISIT (OUTPATIENT)
Dept: PHYSICAL THERAPY | Facility: CLINIC | Age: 60
End: 2025-03-05
Payer: COMMERCIAL

## 2025-03-05 DIAGNOSIS — S83.242A TEAR OF MEDIAL MENISCUS OF LEFT KNEE, UNSPECIFIED TEAR TYPE, UNSPECIFIED WHETHER OLD OR CURRENT TEAR, INITIAL ENCOUNTER: ICD-10-CM

## 2025-03-05 DIAGNOSIS — M25.562 ACUTE PAIN OF LEFT KNEE: Primary | ICD-10-CM

## 2025-03-05 PROCEDURE — 97112 NEUROMUSCULAR REEDUCATION: CPT | Performed by: PHYSICAL THERAPIST

## 2025-03-05 PROCEDURE — 97110 THERAPEUTIC EXERCISES: CPT | Performed by: PHYSICAL THERAPIST

## 2025-03-05 NOTE — PROGRESS NOTES
"Daily Note     Today's date: 3/5/2025  Patient name: Pricilla Norman  : 1965  MRN: [id number]  Referring provider: Lambert Hunt PA*  Dx:   Encounter Diagnosis     ICD-10-CM    1. Acute pain of left knee  M25.562       2. Tear of medial meniscus of left knee, unspecified tear type, unspecified whether old or current tear, initial encounter  S83.242A                            Subjective: Patient reports performing steps a lot the past few days as she is still cleaning out the old home including lifting furniture resulting in some soreness but no significant inflammation.       Objective: See treatment diary below      Assessment: Progressing resistance and neuromuscular demands and patient is tolerating well.  Increased lift demands and stressed importance of lumbopelvic motion to limit knee stress and prevent excessive anterior tibials excursion.  Overall patient continues to make progress toward goals.       Plan: Continue per plan of care.      Precautions:   Patient Active Problem List   Diagnosis    Internal derangement of left knee    GERD (gastroesophageal reflux disease)    Hypertension    PONV (postoperative nausea and vomiting)    Tear of medial meniscus of left knee, unspecified tear type, unspecified whether old or current tear, initial encounter     Visits 1x/wk per High copay      Manuals 12/24 12/31 1/15 1/22 2/5 2/12 2/19 2/26 3/5    L knee patellar mobs             L knee PROM             L knee scar mobs             Re-eval   25'          Neuro Re-Ed             Sharpened romberg on foam 3x20\" B/L 3x20\" B/L 3x20\" B/L 3x20\" B/L 3x20\" B/L 3x20\" B/L 3x20\" B/L 3x20\" B/L     SLS on foam         3x20\"    SLS w ball catch - self toss on foam 2x10 2x10 2x10 2x10         Single leg foam step ups     2x10 x2\" 2x10 x2\" 2x10 x2\" 2x10 x2\" 2x10 x2\"    Rockerboard M/L stability     3x20\" 3x20\" 2x5 mini squat 2x5 mini squats 2x10 mini squats BOSU    Single leg y      X5 reps X5 reps      Ther Ex   " "          Recumbent bike ROM L1 5' L1 5' L2 5' L2 5' L2 5' upright L2 5' upright L2 5' upright L2 5' upright Upright L2 5'    Quad sets             Heel slides w/ strap             SLR in supine 2x10 chair 2x10 table  3x10  3x10        LAQ -  3x10 3# 3x10 4#  3x10 5# 5# 3x15 7.5# 3x10  7.5# 3x10 7.5# 3x10    Leg Press - 90 deg knee bend - single leg 45# 3x10 35# 3x10  45# 3x10 55# 3x10 55# 3x10 55# 3x10 55# 3x10 65# 3x10    Squats to chair     2x5 2x5 5#       Standing gastroc stretch 3x20\" 3x20\"           Standing heel and toe raises 2x10 single leg 2x10 single leg 2x10 single leg 3x10 single leg 3x10 single leg 3x10 single leg 3x10 single leg 3x10 single leg 3x10 single leg    Seated HS curls with TB - green DC    3x10 3x10 3x10 3x10 3x10    HS curls on chair 4x20ft 4x20 ft.  4x20 ft. 4x20 ft. 4x20ft.       Step ups/down - 6\" 2x10 2x10           Wall squats      2x10 2x10      Ther Activity             Gait training             Step ups 6\"             Lifting mechanics review for work sim.   15# x2      25# 2x10    Dependent transfers   NV Reviewed (15')         Modalities                                              "

## 2025-03-12 ENCOUNTER — OFFICE VISIT (OUTPATIENT)
Dept: PHYSICAL THERAPY | Facility: CLINIC | Age: 60
End: 2025-03-12
Payer: COMMERCIAL

## 2025-03-12 DIAGNOSIS — M25.562 ACUTE PAIN OF LEFT KNEE: Primary | ICD-10-CM

## 2025-03-12 DIAGNOSIS — S83.242A TEAR OF MEDIAL MENISCUS OF LEFT KNEE, UNSPECIFIED TEAR TYPE, UNSPECIFIED WHETHER OLD OR CURRENT TEAR, INITIAL ENCOUNTER: ICD-10-CM

## 2025-03-12 PROCEDURE — 97112 NEUROMUSCULAR REEDUCATION: CPT | Performed by: PHYSICAL THERAPIST

## 2025-03-12 PROCEDURE — 97110 THERAPEUTIC EXERCISES: CPT | Performed by: PHYSICAL THERAPIST

## 2025-03-12 NOTE — PROGRESS NOTES
"Daily Note     Today's date: 3/12/2025  Patient name: Pricilla Norman  : 1965  MRN: [id number]  Referring provider: Lambert Hunt PA*  Dx:   Encounter Diagnosis     ICD-10-CM    1. Acute pain of left knee  M25.562       2. Tear of medial meniscus of left knee, unspecified tear type, unspecified whether old or current tear, initial encounter  S83.242A                              Subjective: Patient reports increased medial knee pain over the past week again.  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      Objective: See treatment diary below      Assessment: Secondary to medial joint line tenderness today, we limited single leg activity with neuromuscular stability exercises to limit knee strain.  Excellent tolerance without instability with B/L distribution of forces.  Overall patient is making steady functional mobility gains.    Plan: Continue per plan of care.      Precautions:   Patient Active Problem List   Diagnosis    Internal derangement of left knee    GERD (gastroesophageal reflux disease)    Hypertension    PONV (postoperative nausea and vomiting)    Tear of medial meniscus of left knee, unspecified tear type, unspecified whether old or current tear, initial encounter     Visits 1x/wk per High copay      Manuals 3/12        3/5 3/12   L knee patellar mobs             L knee PROM             L knee scar mobs             Re-eval             Neuro Re-Ed             Sharpened romberg on foam 4x20\" B/L            SLS on foam         3x20\" 3x20\"   SLS w ball catch - self toss on foam             Single leg foam step ups         2x10 x2\" 2x10 x2\"   Rockerboard M/L stability 2x10 mini squats BOSU        2x10 mini squats BOSU 2x10 mini squats BOSU   Single leg y             Ther Ex             Recumbent bike ROM Upright L2 5'        Upright L2 5' Upright L2 5'   Quad sets             Heel slides w/ strap " "            SLR in supine             LAQ -  7.5# 3x10        7.5# 3x10 7.5# 3x10   Leg Press - 90 deg knee bend - single leg 135# 3x10 double leg        65# 3x10 65# 3x10   Squats to chair             Standing gastroc stretch             Standing heel and toe raises 3x10 single leg        3x10 single leg 3x10 single leg   Seated HS curls with TB - green 3x10        3x10 3x10   HS curls on chair             Step ups/down - 6\"             Wall squats             Ther Activity             Gait training             Step ups 6\"             Lifting mechanics review for work sim.         25# 2x10 25# 2x10   Dependent transfers             Modalities                                              "

## 2025-03-15 DIAGNOSIS — Z98.890 STATUS POST MEDIAL MENISCUS REPAIR: ICD-10-CM

## 2025-03-17 RX ORDER — CELECOXIB 100 MG/1
100 CAPSULE ORAL 2 TIMES DAILY
Qty: 60 CAPSULE | Refills: 1 | Status: SHIPPED | OUTPATIENT
Start: 2025-03-17

## 2025-03-26 ENCOUNTER — OFFICE VISIT (OUTPATIENT)
Dept: PHYSICAL THERAPY | Facility: CLINIC | Age: 60
End: 2025-03-26
Payer: COMMERCIAL

## 2025-03-26 DIAGNOSIS — S83.242A TEAR OF MEDIAL MENISCUS OF LEFT KNEE, UNSPECIFIED TEAR TYPE, UNSPECIFIED WHETHER OLD OR CURRENT TEAR, INITIAL ENCOUNTER: ICD-10-CM

## 2025-03-26 DIAGNOSIS — M25.562 ACUTE PAIN OF LEFT KNEE: Primary | ICD-10-CM

## 2025-03-26 PROCEDURE — 97140 MANUAL THERAPY 1/> REGIONS: CPT | Performed by: PHYSICAL THERAPIST

## 2025-03-26 PROCEDURE — 97112 NEUROMUSCULAR REEDUCATION: CPT | Performed by: PHYSICAL THERAPIST

## 2025-03-26 PROCEDURE — 97110 THERAPEUTIC EXERCISES: CPT | Performed by: PHYSICAL THERAPIST

## 2025-03-26 NOTE — PROGRESS NOTES
PT Re-Evaluation     Today's date: 3/26/2025  Patient name: Pricilla Norman  : 1965  MRN: [id number]  Referring provider: Lambert Hunt PA*  Dx:   Encounter Diagnosis     ICD-10-CM    1. Acute pain of left knee  M25.562       2. Tear of medial meniscus of left knee, unspecified tear type, unspecified whether old or current tear, initial encounter  S83.242A                        Assessment  Impairments: abnormal gait, abnormal or restricted ROM, abnormal movement, activity intolerance, impaired balance, impaired physical strength, lacks appropriate home exercise program, pain with function and weight-bearing intolerance    Assessment details: Patient is a 59 y.o. year old female who attended physical therapy for 19 treatment sessions and 24 weeks s/p left knee arthroscopic repair regarding a complex tear posterior horn/root of the medial meniscus that was repaired and a complex tear of the posterior horn of the lateral meniscus on 10/10/2024. Patient reports significant improvement at this time which correlates to improved FOTO scoring.  Patient has shown improvement throughout PT by demonstrating  increased range of motion, increased strength, improved tolerance to activity, and improved gait/balance.  Patient continues to present with pain, decreased ROM, decreased strength, and decreased tolerance to activity. Pricilla Kay would benefit from continued physical therapy to address these issues and to maximize function. Thank you.      Understanding of Dx/Px/POC: excellent     Prognosis: excellent    Goals  STG (6 weeks)  1. Patient will be independent with initial HEP (MET)  2. Decrease pain at worst by 2 points on NPRS (MET)  3. Increase left knee PROM to full and pain free (MET)  4. Patient will demonstrate ability to ambulate with normal gait mechanics without AD (MET)  LTG (4-6 months)  1. Decrease pain at worst from 4 points on NPRS (PROGRESSING)  2. Increase single hop to within 85% of  contralateral limb for resumption of dynamic hiking activities (NOT MET)  3. Increase left quad/hamstring strength equal to the right per MMT (PROGRESSING)  4. Patient will demonstrate ability to perform dynamic SLS without pain or LOB (MET)  5. Increase FOTO score > or equal to expected outcome (PROGRESSING)      Plan  Patient would benefit from: skilled PT    Planned therapy interventions: joint mobilization, manual therapy, patient education, neuromuscular re-education, strengthening, stretching, therapeutic activities, therapeutic exercise, transfer training, home exercise program, functional ROM exercises, balance/weight bearing training and ADL training    Frequency: 1x week  Duration in weeks: 8  Treatment plan discussed with: patient        Subjective Evaluation    History of Present Illness  Mechanism of injury: Patient reports to outpatient PT s/p arthroscopic repair regarding a left knee complex tear posterior horn/root of the medial meniscus that was REPAIRED and a complex tear of the posterior horn of the lateral meniscus that was debrided on 10/10/2024.  Dr. Nur educated patient on early mobilization and will be TTWB for 2-4 weeks.  Post-operatively, patient describes the pain as achy and sharp which is worse with movement of the leg and improved with rest and ice.  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  Patients goals for PT are to decrease the pain and return to PLOF.      1/15/2025: Patient is now 14 weeks post-operative.  States that she has been unable to resume normal work duties as a result of continued pain, swelling and instability.  The knee was drained of fluid last week and patient does note improved mobility with less discomfort since.  Notes that her greatest impairments include weakness and instability preventing her from performing her normal job duties.       3/26/2025: Patient reports HEP compliance overall.   States that she returned to work last week and has been able to manage her work duties with work accommodations for shortened work days/hours.  Notes some increased swelling and discomfort from standing for prolonged periods but no instability present.            Recurrent probem    Quality of life: good    Patient Goals  Patient goals for therapy: increased strength, independence with ADLs/IADLs, return to sport/leisure activities, increased motion and decreased pain    Pain  Current pain ratin  At best pain ratin  At worst pain ratin  Quality: dull ache and tight  Relieving factors: rest, relaxation, ice and medications (Tramadol for pain management)  Aggravating factors: standing and walking (rest at night)    Social Support  Steps to enter house: yes (2 w/ railing)  Stairs in house: no   Lives in: one-story house  Lives with: significant other    Employment status: working ([employer])    Diagnostic Tests  MRI studies: abnormal        Objective     Observations     Additional Observation Details  (+) 1+ pitting edema (RESOLVED but joint effusion remains present)    (+) high risk for DVT per [place] Criteria (RESOLVED)      Neurological Testing     Sensation     Knee   Left Knee   Intact: Light touch    Active Range of Motion   Left Knee   Flexion: 130 degrees WFL  Extension: 0 degrees WFL    Passive Range of Motion   Left Knee   Flexion: 130 degrees WFL  Extension: 0 degrees WFL    Mobility   Patellar Mobility:   Left Knee   WFL: medial, lateral, superior and inferior.     Strength/Myotome Testing     Left Knee   Flexion: 4  Extension: 4  Quadriceps contraction: fair    Right Knee   Flexion: 5  Extension: 5  Quadriceps contraction: good    Additional Strength Details  Hip abduction MMT: 4- on L, 4 on R    L hamstring make testin/5  L quad make testin/5    Tests     Additional Tests Details  Single leg hop test:   R = 69cm  L = Unable; leg  "gave out  SLS: > 30 seconds    Ambulation   Weight-Bearing Status     Additional Weight-Bearing Status Details  Gait assessment: Normal gait mechanics and gait speed without an AD    Stair navigation: Moderate contralateral vault ascending and eccentric loss while descending steps while using a rail given occasional buckling    5XSTS: 9.70 seconds without UE push off             Precautions:   Patient Active Problem List   Diagnosis    Internal derangement of left knee    GERD (gastroesophageal reflux disease)    Hypertension    PONV (postoperative nausea and vomiting)    Tear of medial meniscus of left knee, unspecified tear type, unspecified whether old or current tear, initial encounter    Status post medial meniscus repair    Greater trochanteric bursitis of left hip     TTWB x4 weeks  ROM as tolerated  Visits 1x/wk per High copay      Manuals 3/26        3/5 3/12   L knee patellar mobs             L knee PROM             L knee scar mobs             Re-eval 15'            Neuro Re-Ed             Sharpened romberg on foam 4x20\" B/L            SLS on foam         3x20\" 3x20\"   SLS w ball catch - self toss on foam             Single leg foam step ups 2x10 x2\"        2x10 x2\" 2x10 x2\"   Rockerboard M/L stability         2x10 mini squats BOSU 2x10 mini squats BOSU   Single leg y             Ther Ex             Recumbent bike ROM Upright L2 5'        Upright L2 5' Upright L2 5'   Quad sets             Heel slides w/ strap             SLR in supine             LAQ -  7.5# 3x10        7.5# 3x10 7.5# 3x10   Leg Press - 90 deg knee bend - single leg 135# 3x10 double leg        65# 3x10 65# 3x10   Squats to chair             Standing gastroc stretch             Standing heel and toe raises 2x10 single leg        3x10 single leg 3x10 single leg   Seated HS curls with TB - green NV        3x10 3x10   HS curls on chair             Step ups/down - 6\"             Wall squats             Ther Activity             Gait training  " "           Step ups 6\"             Lifting mechanics review for work sim. NV        25# 2x10 25# 2x10   Dependent transfers             Modalities                                            "

## 2025-04-02 ENCOUNTER — OFFICE VISIT (OUTPATIENT)
Dept: PHYSICAL THERAPY | Facility: CLINIC | Age: 60
End: 2025-04-02
Payer: COMMERCIAL

## 2025-04-02 DIAGNOSIS — M25.562 ACUTE PAIN OF LEFT KNEE: Primary | ICD-10-CM

## 2025-04-02 DIAGNOSIS — S83.242A TEAR OF MEDIAL MENISCUS OF LEFT KNEE, UNSPECIFIED TEAR TYPE, UNSPECIFIED WHETHER OLD OR CURRENT TEAR, INITIAL ENCOUNTER: ICD-10-CM

## 2025-04-02 PROCEDURE — 97112 NEUROMUSCULAR REEDUCATION: CPT | Performed by: PHYSICAL THERAPIST

## 2025-04-02 PROCEDURE — 97110 THERAPEUTIC EXERCISES: CPT | Performed by: PHYSICAL THERAPIST

## 2025-04-02 NOTE — PROGRESS NOTES
"Daily Note     Today's date: 2025  Patient name: Pricilla Norman  : 1965  MRN: [id number]  Referring provider: Lambert Hunt PA*  Dx:   Encounter Diagnosis     ICD-10-CM    1. Acute pain of left knee  M25.562       2. Tear of medial meniscus of left knee, unspecified tear type, unspecified whether old or current tear, initial encounter  S83.242A                      Subjective: Patient reports continued soreness of the left knee since return to work.  Notes that she has been able to manage the current reduced work schedule but since testing a single hop last session and the knee buckling has experienced some swelling and discomfort with activity again. Denies catching, clicking or locking.       Objective: See treatment diary below      Assessment: Patient demonstrating steady improvement in neuromuscular stability and control despite weakness today.  Left knee PROM remains full without joint sounds despite discomfort.  Proper form and mechanics noted with lifting as this is required with heavy weights frequently at work.       Plan: Continue per plan of care.      Precautions:   Patient Active Problem List   Diagnosis    Internal derangement of left knee    GERD (gastroesophageal reflux disease)    Hypertension    PONV (postoperative nausea and vomiting)    Tear of medial meniscus of left knee, unspecified tear type, unspecified whether old or current tear, initial encounter    Status post medial meniscus repair    Greater trochanteric bursitis of left hip     TTWB x4 weeks  ROM as tolerated  Visits 1x/wk per High copay      Manuals 3/26 4/2       3/5 3/12   L knee patellar mobs             L knee PROM             L knee scar mobs             Re-eval 15'            Neuro Re-Ed             Sharpened romberg on foam 4x20\" B/L            SLS on foam         3x20\" 3x20\"   SLS w ball catch - self toss on foam  2x10           Single leg foam step ups 2x10 x2\" 2x10 x2\"       2x10 x2\" 2x10 x2\" " "  Rockerboard M/L stability  w/ perturbations        2x10 mini squats BOSU 2x10 mini squats BOSU   Single leg y             Ther Ex             Recumbent bike ROM Upright L2 5' Upright L2 5'       Upright L2 5' Upright L2 5'   Quad sets             Heel slides w/ strap             SLR in supine             LAQ -  7.5# 3x10 7.5# 3x10       7.5# 3x10 7.5# 3x10   Leg Press - 90 deg knee bend - single leg 135# 3x10 double leg 145# double leg  3x10       65# 3x10 65# 3x10   Squats to chair             Standing gastroc stretch             Standing heel and toe raises 2x10 single leg 3x10 single leg       3x10 single leg 3x10 single leg   Seated HS curls with TB - green NV        3x10 3x10   HS curls on chair  20 ft. x6           Step ups/down - 6\"             Wall squats             Ther Activity             Gait training             Step ups 6\"             Lifting mechanics review for work sim. NV 30# x10       25# 2x10 25# 2x10   Dependent transfers             Modalities                                            "

## 2025-04-08 ENCOUNTER — APPOINTMENT (OUTPATIENT)
Dept: PHYSICAL THERAPY | Facility: CLINIC | Age: 60
End: 2025-04-08
Payer: COMMERCIAL

## 2025-04-16 ENCOUNTER — OFFICE VISIT (OUTPATIENT)
Dept: PHYSICAL THERAPY | Facility: CLINIC | Age: 60
End: 2025-04-16
Payer: COMMERCIAL

## 2025-04-16 DIAGNOSIS — S83.242A TEAR OF MEDIAL MENISCUS OF LEFT KNEE, UNSPECIFIED TEAR TYPE, UNSPECIFIED WHETHER OLD OR CURRENT TEAR, INITIAL ENCOUNTER: ICD-10-CM

## 2025-04-16 DIAGNOSIS — M25.562 ACUTE PAIN OF LEFT KNEE: Primary | ICD-10-CM

## 2025-04-16 PROCEDURE — 97112 NEUROMUSCULAR REEDUCATION: CPT | Performed by: PHYSICAL THERAPIST

## 2025-04-16 PROCEDURE — 97110 THERAPEUTIC EXERCISES: CPT | Performed by: PHYSICAL THERAPIST

## 2025-04-16 NOTE — PROGRESS NOTES
"Daily Note     Today's date: 2025  Patient name: Pricilla Norman  : 1965  MRN: [id number]  Referring provider: Lambert Hunt PA*  Dx:   Encounter Diagnosis     ICD-10-CM    1. Acute pain of left knee  M25.562       2. Tear of medial meniscus of left knee, unspecified tear type, unspecified whether old or current tear, initial encounter  S83.242A                        Subjective: Patient occasional pain, instability and swelling despite improvement since last visit.        Objective: See treatment diary below      Assessment: Patient making continued neuromuscular stability gain without evidence of instability during treatment.  Concentric phases remain slow with TE per weakness but is demonstrating fair tolerance to progressive strengthening.  Greatest limitations remain decreased strength/power.       Plan: Continue per plan of care.      Precautions:   Patient Active Problem List   Diagnosis    Internal derangement of left knee    GERD (gastroesophageal reflux disease)    Hypertension    PONV (postoperative nausea and vomiting)    Tear of medial meniscus of left knee, unspecified tear type, unspecified whether old or current tear, initial encounter    Status post medial meniscus repair    Greater trochanteric bursitis of left hip     TTWB x4 weeks  ROM as tolerated  Visits 1x/wk per High copay      Manuals 3/26 4/2 4/16      3/5 3/12   L knee patellar mobs             L knee PROM             L knee scar mobs             Re-eval 15'            Neuro Re-Ed             Sharpened romberg on foam 4x20\" B/L            SLS on foam         3x20\" 3x20\"   SLS w ball catch - self toss on foam  2x10 2x10          Single leg foam step ups 2x10 x2\" 2x10 x2\" 2x10 x2\"      2x10 x2\" 2x10 x2\"   Rockerboard M/L stability  w/ perturbations  W/ perturbations 1'x3      2x10 mini squats BOSU 2x10 mini squats BOSU   Single leg y             Ther Ex             Recumbent bike ROM Upright L2 5' Upright L2 5' Upright " "L2 5'      Upright L2 5' Upright L2 5'   Quad sets             Heel slides w/ strap             SLR in supine             LAQ -  7.5# 3x10 7.5# 3x10       7.5# 3x10 7.5# 3x10   Leg Press - 90 deg knee bend - single leg 135# 3x10 double leg 145# double leg  3x10 145# double leg 3x10      65# 3x10 65# 3x10   Squats to chair             Standing gastroc stretch             Standing heel and toe raises 2x10 single leg 3x10 single leg 3x10 single leg      3x10 single leg 3x10 single leg   Seated HS curls with TB - green NV        3x10 3x10   HS curls on chair  20 ft. x6 20ft. x6          Step ups/down - 6\"             Wall squats             Ther Activity             Gait training             Step ups 6\"             Lifting mechanics review for work sim. NV 30# x10       25# 2x10 25# 2x10   Dependent transfers             Modalities                                            "

## 2025-04-22 ENCOUNTER — OFFICE VISIT (OUTPATIENT)
Dept: PHYSICAL THERAPY | Facility: CLINIC | Age: 60
End: 2025-04-22
Payer: COMMERCIAL

## 2025-04-22 DIAGNOSIS — S83.242A TEAR OF MEDIAL MENISCUS OF LEFT KNEE, UNSPECIFIED TEAR TYPE, UNSPECIFIED WHETHER OLD OR CURRENT TEAR, INITIAL ENCOUNTER: ICD-10-CM

## 2025-04-22 DIAGNOSIS — M25.562 ACUTE PAIN OF LEFT KNEE: Primary | ICD-10-CM

## 2025-04-22 PROCEDURE — 97110 THERAPEUTIC EXERCISES: CPT

## 2025-04-22 PROCEDURE — 97112 NEUROMUSCULAR REEDUCATION: CPT

## 2025-04-22 NOTE — PROGRESS NOTES
"Daily Note     Today's date: 2025  Patient name: Pricilla Norman  : 1965  MRN: [id number]  Referring provider: Lambert Hunt PA*  Dx:   Encounter Diagnosis     ICD-10-CM    1. Acute pain of left knee  M25.562       2. Tear of medial meniscus of left knee, unspecified tear type, unspecified whether old or current tear, initial encounter  S83.242A                      Subjective: Patient reported that she is frustrated with continued swelling and discomfort which results in feeling of stiffness at end range flex and ext. Occasional instability with walking.      Objective: See treatment diary below.      Assessment: Making steady progress with neuromuscular stability with no noted instability during balance activities. Remains limited by discomfort and swelling which is inhibiting strength and power. Increased soreness in anterior aspect of knee noted throughout session.      Plan: Continue per plan of care.  Progress treatment as tolerated.           Precautions:   Patient Active Problem List   Diagnosis    Internal derangement of left knee    GERD (gastroesophageal reflux disease)    Hypertension    PONV (postoperative nausea and vomiting)    Tear of medial meniscus of left knee, unspecified tear type, unspecified whether old or current tear, initial encounter    Status post medial meniscus repair    Greater trochanteric bursitis of left hip     TTWB x4 weeks  ROM as tolerated  Visits 1x/wk per High copay    Manuals 3/26 4/2 4/16 4/22         L knee patellar mobs             L knee PROM             L knee scar mobs             Re-eval 15'                         Neuro Re-Ed             Sharpened romberg on foam 4x20\" B/L            SLS on foam             SLS w ball catch - self toss on foam  2x10 2x10 2x10         Single leg foam step ups 2x10 x2\" 2x10 x2\" 2x10 x2\" 2\" hold,  2x10         Rockerboard M/L stability  w/ perturbations  W/ perturbations 1'x3 With perturbations    1 min  x2       " "  Single leg y                          Ther Ex             Recumbent bike ROM Upright L2 5' Upright L2 5' Upright L2 5' Upright  5 min  L2         Quad sets             Heel slides w/ strap             SLR in supine             LAQ -  7.5# 3x10 7.5# 3x10           Leg Press - 90 deg knee bend - single leg 135# 3x10 double leg 145# double leg  3x10 145# double leg 3x10 Double leg     145#   3x10         Squats to chair             Standing gastroc stretch             Standing heel and toe raises 2x10 single leg 3x10 single leg 3x10 single leg  Single leg     3x10         Seated HS curls with TB - green NV            HS curls on chair  20 ft. x6 20ft. x6  20 ft   x6         Step ups/down - 6\"             Wall squats             Junior lateral stepping     7.0   x5   bilat                      Ther Activity             Gait training             Step ups 6\"             Lifting mechanics review for work sim. NV 30# x10           Dependent transfers                          Modalities                                            "

## 2025-04-25 ENCOUNTER — TELEPHONE (OUTPATIENT)
Dept: OBGYN CLINIC | Facility: HOSPITAL | Age: 60
End: 2025-04-25

## 2025-05-01 ENCOUNTER — OFFICE VISIT (OUTPATIENT)
Dept: PHYSICAL THERAPY | Facility: CLINIC | Age: 60
End: 2025-05-01
Payer: COMMERCIAL

## 2025-05-01 DIAGNOSIS — S83.242A TEAR OF MEDIAL MENISCUS OF LEFT KNEE, UNSPECIFIED TEAR TYPE, UNSPECIFIED WHETHER OLD OR CURRENT TEAR, INITIAL ENCOUNTER: ICD-10-CM

## 2025-05-01 DIAGNOSIS — M25.562 ACUTE PAIN OF LEFT KNEE: Primary | ICD-10-CM

## 2025-05-01 PROCEDURE — 97140 MANUAL THERAPY 1/> REGIONS: CPT

## 2025-05-01 NOTE — PROGRESS NOTES
Daily Note     Today's date: 2025  Patient name: Pricilla Norman  : 1965  MRN: [id number]  Referring provider: Lambert Hunt PA*  Dx:   Encounter Diagnosis     ICD-10-CM    1. Acute pain of left knee  M25.562       2. Tear of medial meniscus of left knee, unspecified tear type, unspecified whether old or current tear, initial encounter  S83.242A                      Subjective: Patient reported that over the weekend she was walking in a parking lot when her L knee buckled and she fell to the ground as there was nothing around for her to grab for. She admits this continues to happen on occasion but is typically able to catch herself as she is closer to objects when this occurs. Did not fall directly on L knee. Since the fall, she has had more swelling (that she notes since improved) and increased presence of instability. Denied difficulty tolerating full weight bearing on L.      Objective: See treatment diary below.      Assessment: Upon observation, patient with slight increase in swelling, specifically over anterior portion knee. No bruising noted throughout L LE. Difficulty with tolerating full flex and ext due to feeling of stiffness given swelling around joint. Indirect low level laser utilized to help with pain modulation and decreasing presence of inflammation for improving tolerance with weight bearing activities. Discussed with patient that if she is concerned over persistent swelling and instability to contact ortho for a follow-up visit. 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 Assess symptoms NV.      Plan: Continue per plan of care.  Progress treatment as tolerated.           Precautions:   Patient Active Problem List   Diagnosis    Internal derangement of left knee    GERD (gastroesophageal reflux disease)    Hypertension     "PONV (postoperative nausea and vomiting)    Tear of medial meniscus of left knee, unspecified tear type, unspecified whether old or current tear, initial encounter    Status post medial meniscus repair    Greater trochanteric bursitis of left hip     TTWB x4 weeks  ROM as tolerated  Visits 1x/wk per High copay    Manuals 3/26 4/2 4/16 4/22 5/1        L knee patellar mobs     PROM - EH        L knee PROM     EH        L knee scar mobs             Re-eval 15'                         Neuro Re-Ed             Sharpened romberg on foam 4x20\" B/L            SLS on foam             SLS w ball catch - self toss on foam  2x10 2x10 2x10         Single leg foam step ups 2x10 x2\" 2x10 x2\" 2x10 x2\" 2\" hold,  2x10         Rockerboard M/L stability  w/ perturbations  W/ perturbations 1'x3 With perturbations    1 min  x2         Single leg y                          Ther Ex             Recumbent bike ROM Upright L2 5' Upright L2 5' Upright L2 5' Upright  5 min  L2         Quad sets             Heel slides w/ strap             SLR in supine             LAQ -  7.5# 3x10 7.5# 3x10           Leg Press - 90 deg knee bend - single leg 135# 3x10 double leg 145# double leg  3x10 145# double leg 3x10 Double leg     145#   3x10         Squats to chair             Standing gastroc stretch             Standing heel and toe raises 2x10 single leg 3x10 single leg 3x10 single leg  Single leg     3x10         Seated HS curls with TB - green NV            HS curls on chair  20 ft. x6 20ft. x6  20 ft   x6         Step ups/down - 6\"             Wall squats             Junior lateral stepping     7.0   x5   bilat                      Ther Activity             Gait training             Step ups 6\"             Lifting mechanics review for work sim. NV 30# x10           Dependent transfers                          Modalities             Indirect low level laser - knee post-op protocol 10W     EH                          "

## 2025-05-07 ENCOUNTER — OFFICE VISIT (OUTPATIENT)
Dept: PHYSICAL THERAPY | Facility: CLINIC | Age: 60
End: 2025-05-07
Payer: COMMERCIAL

## 2025-05-07 DIAGNOSIS — M25.562 ACUTE PAIN OF LEFT KNEE: Primary | ICD-10-CM

## 2025-05-07 DIAGNOSIS — S83.242A TEAR OF MEDIAL MENISCUS OF LEFT KNEE, UNSPECIFIED TEAR TYPE, UNSPECIFIED WHETHER OLD OR CURRENT TEAR, INITIAL ENCOUNTER: ICD-10-CM

## 2025-05-07 PROCEDURE — 97110 THERAPEUTIC EXERCISES: CPT | Performed by: PHYSICAL THERAPIST

## 2025-05-07 PROCEDURE — 97140 MANUAL THERAPY 1/> REGIONS: CPT | Performed by: PHYSICAL THERAPIST

## 2025-05-07 NOTE — PROGRESS NOTES
"Daily Note     Today's date: 2025  Patient name: Pricilla Norman  : 1965  MRN: [id number]  Referring provider: Lambert Hunt PA*  Dx:   Encounter Diagnosis     ICD-10-CM    1. Acute pain of left knee  M25.562       2. Tear of medial meniscus of left knee, unspecified tear type, unspecified whether old or current tear, initial encounter  S83.242A                        Subjective: Patient reports mild improvement since last session and mild reduction in swelling.  Notes that the knee giving out continues to happen at a high frequency.        Objective: See treatment diary below.    (-) Pradip and Apley's compression    Assessment: Excellent knee extension and VMO activation.  Assessment negative for significant findings of intraarticular pathology and minimal joint effusion present.  Continued strengthening and stability training as noted and followed up treatment with laser treatment for swelling and inflammatory benefits.        Plan: Continue per plan of care.          Precautions:   Patient Active Problem List   Diagnosis    Internal derangement of left knee    GERD (gastroesophageal reflux disease)    Hypertension    PONV (postoperative nausea and vomiting)    Tear of medial meniscus of left knee, unspecified tear type, unspecified whether old or current tear, initial encounter    Status post medial meniscus repair    Greater trochanteric bursitis of left hip     TTWB x4 weeks  ROM as tolerated  Visits 1x/wk per High copay    Manuals 3/26 4/2 4/16 4/22 5 57       L knee patellar mobs     PROM - EH        L knee PROM     EH        L knee scar mobs             Re-eval 15'            L knee assessment      8'       L knee photobiomodulation      15W 3'                    Neuro Re-Ed             Sharpened romberg on foam 4x20\" B/L            SLS on foam             SLS w ball catch - self toss on foam  2x10 2x10 2x10         Single leg foam step ups 2x10 x2\" 2x10 x2\" 2x10 x2\" 2\" hold,  2x10 " "        Rockerboard M/L stability  w/ perturbations  W/ perturbations 1'x3 With perturbations    1 min  x2         Single leg y                          Ther Ex             Recumbent bike ROM Upright L2 5' Upright L2 5' Upright L2 5' Upright  5 min  L2  L1 5'       Quad sets             Heel slides w/ strap             SLR in supine             LAQ -  7.5# 3x10 7.5# 3x10           Leg Press - 90 deg knee bend - single leg 135# 3x10 double leg 145# double leg  3x10 145# double leg 3x10 Double leg     145#   3x10 Double leg 145# 3x10 Double leg 145# 3x10       Squats to chair             Standing gastroc stretch             Standing heel and toe raises 2x10 single leg 3x10 single leg 3x10 single leg  Single leg     3x10  Single leg 3x10       Seated HS curls with TB - green NV            HS curls on chair  20 ft. x6 20ft. x6  20 ft   x6         Step ups/down - 6\"             Wall squats             Junior lateral stepping     7.0   x5   bilat  10# x5 B/L       Sled push and pull      50ft x2 ea.       Ther Activity             Gait training             Step ups 6\"             Lifting mechanics review for work sim. NV 30# x10           Dependent transfers                          Modalities             Indirect low level laser - knee post-op protocol 10W     EH                          "

## 2025-05-15 ENCOUNTER — OFFICE VISIT (OUTPATIENT)
Dept: PHYSICAL THERAPY | Facility: CLINIC | Age: 60
End: 2025-05-15
Payer: COMMERCIAL

## 2025-05-15 DIAGNOSIS — M25.562 ACUTE PAIN OF LEFT KNEE: Primary | ICD-10-CM

## 2025-05-15 DIAGNOSIS — S83.242A TEAR OF MEDIAL MENISCUS OF LEFT KNEE, UNSPECIFIED TEAR TYPE, UNSPECIFIED WHETHER OLD OR CURRENT TEAR, INITIAL ENCOUNTER: ICD-10-CM

## 2025-05-15 PROCEDURE — 97110 THERAPEUTIC EXERCISES: CPT

## 2025-05-15 NOTE — PROGRESS NOTES
"Daily Note     Today's date: 5/15/2025  Patient name: Pricilla Norman  : 1965  MRN: [id number]  Referring provider: Lambert Hunt PA*  Dx:   Encounter Diagnosis     ICD-10-CM    1. Acute pain of left knee  M25.562       2. Tear of medial meniscus of left knee, unspecified tear type, unspecified whether old or current tear, initial encounter  S83.242A                        Subjective: Patient continues to be limited by swelling in L knee and reported continued instability present. She noted fatigue by the end of her work day.      Objective: See treatment diary below.      Assessment: Strength deficits persist on L, noticeable with ext activities. Continued use of low level laser for improving inflammatory response and improving swelling. Skilled PT will continue to work on improving feeling of instability with functional activities through progressions of strengthening and stability exercises.      Plan: Continue per plan of care.          Precautions:   Patient Active Problem List   Diagnosis    Internal derangement of left knee    GERD (gastroesophageal reflux disease)    Hypertension    PONV (postoperative nausea and vomiting)    Tear of medial meniscus of left knee, unspecified tear type, unspecified whether old or current tear, initial encounter    Status post medial meniscus repair    Greater trochanteric bursitis of left hip     ROM as tolerated  Visits 1x/wk per High copay    Manuals 3/26 4/2 4/16 4/22 5/1 5/7 5/15      L knee patellar mobs     PROM - EH        L knee PROM     EH        L knee scar mobs             Re-eval 15'            L knee assessment      8'       L knee photobiomodulation      15W 3' 15 W - EH                   Neuro Re-Ed             Sharpened romberg on foam 4x20\" B/L            SLS on foam             SLS w ball catch - self toss on foam  2x10 2x10 2x10         Single leg foam step ups 2x10 x2\" 2x10 x2\" 2x10 x2\" 2\" hold,  2x10         Rockerboard M/L stability  w/ " "perturbations  W/ perturbations 1'x3 With perturbations    1 min  x2         Single leg y                          Ther Ex             Recumbent bike ROM Upright L2 5' Upright L2 5' Upright L2 5' Upright  5 min  L2  L1 5' 5 min  L1      Quad sets             Heel slides w/ strap             SLR in supine             LAQ -  7.5# 3x10 7.5# 3x10           Leg Press - 90 deg knee bend - single leg 135# 3x10 double leg 145# double leg  3x10 145# double leg 3x10 Double leg     145#   3x10 Double leg 145# 3x10 Double leg 145# 3x10 Double  Leg    145#  3x10      Squats to chair       Holding YMB  2kg    3x5      Standing gastroc stretch             Standing heel and toe raises 2x10 single leg 3x10 single leg 3x10 single leg  Single leg     3x10  Single leg 3x10  Single leg     3x10      Seated HS curls with TB - green NV            HS curls on chair  20 ft. x6 20ft. x6  20 ft   x6         Step ups/down - 6\"             Wall squats             [place] lateral stepping     7.0   x5   bilat  10# x5 B/L  10.5   x5   bilat      Sled push and pull      50ft x2 ea.  50 ft   x2 ea                   Ther Activity             Gait training             Step ups 6\"             Lifting mechanics review for work sim. NV 30# x10           Dependent transfers                          Modalities             Indirect low level laser - knee post-op protocol 10W     EH                          " 96

## 2025-05-20 ENCOUNTER — OFFICE VISIT (OUTPATIENT)
Dept: PHYSICAL THERAPY | Facility: CLINIC | Age: 60
End: 2025-05-20
Payer: COMMERCIAL

## 2025-05-20 DIAGNOSIS — M25.562 ACUTE PAIN OF LEFT KNEE: Primary | ICD-10-CM

## 2025-05-20 DIAGNOSIS — S83.242A TEAR OF MEDIAL MENISCUS OF LEFT KNEE, UNSPECIFIED TEAR TYPE, UNSPECIFIED WHETHER OLD OR CURRENT TEAR, INITIAL ENCOUNTER: ICD-10-CM

## 2025-05-20 PROCEDURE — 97110 THERAPEUTIC EXERCISES: CPT

## 2025-05-20 NOTE — PROGRESS NOTES
"Daily Note     Today's date: 2025  Patient name: Pricilla Norman  : 1965  MRN: [id number]  Referring provider: Lambert Hunt PA*  Dx:   Encounter Diagnosis     ICD-10-CM    1. Acute pain of left knee  M25.562       2. Tear of medial meniscus of left knee, unspecified tear type, unspecified whether old or current tear, initial encounter  S83.242A                          Subjective: Patient feels like her L knee is still unstable and wants to buckle on her. No pain entering session.      Objective: See treatment diary below.      Assessment: Patient completed session with a positive attitude and no pain. Progressed her into some more strengthening which appropriately fatigued her. Strength deficit is still seen but improving each session.      Plan: Continue per plan of care.          Precautions:   Patient Active Problem List   Diagnosis    Internal derangement of left knee    GERD (gastroesophageal reflux disease)    Hypertension    PONV (postoperative nausea and vomiting)    Tear of medial meniscus of left knee, unspecified tear type, unspecified whether old or current tear, initial encounter    Status post medial meniscus repair    Greater trochanteric bursitis of left hip     ROM as tolerated  Visits 1x/wk per High copay    Manuals 3/26 4/2 4/16 4/22 5/1 5/7 5/15 5/20     L knee patellar mobs     PROM - EH        L knee PROM     EH        L knee scar mobs             Re-eval 15'            L knee assessment      8'       L knee photobiomodulation      15W 3' 15 W - EH 15W  -YOAN                  Neuro Re-Ed             Sharpened romberg on foam 4x20\" B/L            SLS on foam             SLS w ball catch - self toss on foam  2x10 2x10 2x10         Single leg foam step ups 2x10 x2\" 2x10 x2\" 2x10 x2\" 2\" hold,  2x10         Rockerboard M/L stability  w/ perturbations  W/ perturbations 1'x3 With perturbations    1 min  x2         Single leg y                          Ther Ex           " "  Recumbent bike ROM Upright L2 5' Upright L2 5' Upright L2 5' Upright  5 min  L2  L1 5' 5 min  L1 5 min  L1     Quad sets             Heel slides w/ strap             SLR in supine             LAQ -  7.5# 3x10 7.5# 3x10           Leg Press - 90 deg knee bend - single leg 135# 3x10 double leg 145# double leg  3x10 145# double leg 3x10 Double leg     145#   3x10 Double leg 145# 3x10 Double leg 145# 3x10 Double  Leg    145#  3x10 Double leg    145#  3x10     Squats to chair       Holding YMB  2kg    3x5 Holding  YMB  2kg    3x10     Standing gastroc stretch             Standing heel and toe raises 2x10 single leg 3x10 single leg 3x10 single leg  Single leg     3x10  Single leg 3x10  Single leg     3x10 Single leg    3x10     Seated HS curls with TB - green NV            HS curls on chair  20 ft. x6 20ft. x6  20 ft   x6         Step ups/down - 6\"             Wall squats             [place] lateral stepping     7.0   x5   bilat  10# x5 B/L  10.5   x5   bilat 10.5#x5  bilat     Sled push and pull      50ft x2 ea.  50 ft   x2 ea 25ft x4ea     Deadlift with ball tap        2x10     Ther Activity             Gait training             Step ups 6\"             Lifting mechanics review for work sim. NV 30# x10           Dependent transfers                          Modalities             Indirect low level laser - knee post-op protocol 10W     EH                          "

## 2025-05-21 DIAGNOSIS — Z98.890 STATUS POST MEDIAL MENISCUS REPAIR: ICD-10-CM

## 2025-05-22 ENCOUNTER — APPOINTMENT (OUTPATIENT)
Dept: PHYSICAL THERAPY | Facility: CLINIC | Age: 60
End: 2025-05-22
Payer: COMMERCIAL

## 2025-05-23 ENCOUNTER — TELEPHONE (OUTPATIENT)
Dept: OBGYN CLINIC | Facility: CLINIC | Age: 60
End: 2025-05-23

## 2025-05-23 RX ORDER — CELECOXIB 100 MG/1
100 CAPSULE ORAL 2 TIMES DAILY
Qty: 60 CAPSULE | Refills: 1 | Status: SHIPPED | OUTPATIENT
Start: 2025-05-23

## 2025-05-29 ENCOUNTER — OFFICE VISIT (OUTPATIENT)
Dept: PHYSICAL THERAPY | Facility: CLINIC | Age: 60
End: 2025-05-29
Payer: COMMERCIAL

## 2025-05-29 DIAGNOSIS — S83.242A TEAR OF MEDIAL MENISCUS OF LEFT KNEE, UNSPECIFIED TEAR TYPE, UNSPECIFIED WHETHER OLD OR CURRENT TEAR, INITIAL ENCOUNTER: ICD-10-CM

## 2025-05-29 DIAGNOSIS — M25.562 ACUTE PAIN OF LEFT KNEE: Primary | ICD-10-CM

## 2025-05-29 PROCEDURE — 97110 THERAPEUTIC EXERCISES: CPT

## 2025-05-29 PROCEDURE — 97112 NEUROMUSCULAR REEDUCATION: CPT

## 2025-05-29 NOTE — PROGRESS NOTES
"Daily Note     Today's date: 2025  Patient name: Pricilla Norman  : 1965  MRN: [id number]  Referring provider: Lambert Hunt PA*  Dx:   Encounter Diagnosis     ICD-10-CM    1. Acute pain of left knee  M25.562       2. Tear of medial meniscus of left knee, unspecified tear type, unspecified whether old or current tear, initial encounter  S83.242A                            Subjective: Patient feels good lately and is sore from working and HEP. She is on her feet for most of the day with work.      Objective: See treatment diary below.      Assessment: Patient tolerated session well. Was fatigued by end of session which affected SL balance. Will plan to add some more SL balance to give her more stability in L LE to reduce stress on knee. There is still some swelling around the L knee but has improved since last session. Skilled PT remains appropriate to improve knee stability through strengthening and balance exercises.      Plan: Continue per plan of care.          Precautions:   Patient Active Problem List   Diagnosis    Internal derangement of left knee    GERD (gastroesophageal reflux disease)    Hypertension    PONV (postoperative nausea and vomiting)    Tear of medial meniscus of left knee, unspecified tear type, unspecified whether old or current tear, initial encounter    Status post medial meniscus repair    Greater trochanteric bursitis of left hip       ROM as tolerated  Visits 1x/wk per High copay    Manuals 3/26 4/2 4/16 4/22 5/1 5/7 5/15 5/20 5/29    L knee patellar mobs     PROM - EH        L knee PROM     EH        L knee scar mobs             Re-eval 15'            L knee assessment      8'       L knee photobiomodulation      15W 3' 15 W - EH 15W  -YOAN 15W  -YOAN                 Neuro Re-Ed             Sharpened romberg on foam 4x20\" B/L            SLS on foam             SLS w ball catch - self toss on foam  2x10 2x10 2x10         Single leg foam step ups 2x10 x2\" 2x10 x2\" 2x10 " "x2\" 2\" hold,  2x10         Rockerboard M/L stability  w/ perturbations  W/ perturbations 1'x3 With perturbations    1 min  x2         Single leg y                          Ther Ex             Recumbent bike ROM Upright L2 5' Upright L2 5' Upright L2 5' Upright  5 min  L2  L1 5' 5 min  L1 5 min  L1 5 min L1    Quad sets             Heel slides w/ strap             SLR in supine             LAQ -  7.5# 3x10 7.5# 3x10           Leg Press - 90 deg knee bend - single leg 135# 3x10 double leg 145# double leg  3x10 145# double leg 3x10 Double leg     145#   3x10 Double leg 145# 3x10 Double leg 145# 3x10 Double  Leg    145#  3x10 Double leg    145#  3x10 Double leg  145#  3x10    Squats to chair       Holding YMB  2kg    3x5 Holding  YMB  2kg    3x10 Holding YMB  2kg    3x10    Standing gastroc stretch             Standing heel and toe raises 2x10 single leg 3x10 single leg 3x10 single leg  Single leg     3x10  Single leg 3x10  Single leg     3x10 Single leg    3x10 [place]le leg raise    3x10    Seated HS curls with TB - green NV            HS curls on chair  20 ft. x6 20ft. x6  20 ft   x6         Step ups/down - 6\"             Wall squats             Junior lateral stepping     7.0   x5   bilat  10# x5 B/L  10.5   x5   bilat 10.5#x5  bilat 10.5#x5  bilat    Sled push and pull      50ft x2 ea.  50 ft   x2 ea 25ft x4ea 50ftx2  ea    Deadlift with ball tap        2x10 3x10                 Ther Activity             Gait training             Step ups 6\"             Lifting mechanics review for work sim. NV 30# x10           Dependent transfers                          Modalities             Indirect low level laser - knee post-op protocol 10W     EH                          "

## 2025-06-04 ENCOUNTER — APPOINTMENT (OUTPATIENT)
Dept: PHYSICAL THERAPY | Facility: CLINIC | Age: 60
End: 2025-06-04
Payer: COMMERCIAL

## 2025-06-04 NOTE — PROGRESS NOTES
PT Re-Evaluation     Today's date: 2025  Patient name: Pricilla Norman  : 1965  MRN: [id number]  Referring provider: Lambert Hunt PA*  Dx:   No diagnosis found.                   Assessment  Impairments: abnormal gait, abnormal or restricted ROM, abnormal movement, activity intolerance, impaired balance, impaired physical strength, lacks appropriate home exercise program, pain with function and weight-bearing intolerance    Assessment details: Patient is a 59 y.o. year old female who attended physical therapy for 27 treatment sessions and 34 weeks s/p left knee arthroscopic repair regarding a complex tear posterior horn/root of the medial meniscus that was repaired and a complex tear of the posterior horn of the lateral meniscus on 10/10/2024. Patient reports significant improvement at this time which correlates to improved FOTO scoring.  Patient has shown improvement throughout PT by demonstrating  increased range of motion, increased strength, improved tolerance to activity, and improved gait/balance.  Patient continues to present with pain, decreased ROM, decreased strength, and decreased tolerance to activity. Pricilla Kay would benefit from continued physical therapy to address these issues and to maximize function. Thank you.      Understanding of Dx/Px/POC: excellent     Prognosis: excellent    Goals  STG (6 weeks)  1. Patient will be independent with initial HEP (MET)  2. Decrease pain at worst by 2 points on NPRS (MET)  3. Increase left knee PROM to full and pain free (MET)  4. Patient will demonstrate ability to ambulate with normal gait mechanics without AD (MET)  LTG (4-6 months)  1. Decrease pain at worst from 4 points on NPRS (PROGRESSING)  2. Increase single hop to within 85% of contralateral limb for resumption of dynamic hiking activities (NOT MET)  3. Increase left quad/hamstring strength equal to the right per MMT (PROGRESSING)  4. Patient will demonstrate ability to perform  dynamic SLS without pain or LOB (MET)  5. Increase FOTO score > or equal to expected outcome (PROGRESSING)      Plan  Patient would benefit from: skilled PT    Planned therapy interventions: joint mobilization, manual therapy, patient education, neuromuscular re-education, strengthening, stretching, therapeutic activities, therapeutic exercise, transfer training, home exercise program, functional ROM exercises, balance/weight bearing training and ADL training    Frequency: 1x week  Duration in weeks: 8  Treatment plan discussed with: patient        Subjective Evaluation    History of Present Illness  Mechanism of injury: Patient reports to outpatient PT s/p arthroscopic repair regarding a left knee complex tear posterior horn/root of the medial meniscus that was REPAIRED and a complex tear of the posterior horn of the lateral meniscus that was debrided on 10/10/2024.  Dr. Nur educated patient on early mobilization and will be TTWB for 2-4 weeks.  Post-operatively, patient describes the pain as achy and sharp which is worse with movement of the leg and improved with rest and ice.  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  Patients goals for PT are to decrease the pain and return to PLOF.      1/15/2025: Patient is now 14 weeks post-operative.  States that she has been unable to resume normal work duties as a result of continued pain, swelling and instability.  The knee was drained of fluid last week and patient does note improved mobility with less discomfort since.  Notes that her greatest impairments include weakness and instability preventing her from performing her normal job duties.      3/26/2025: Patient reports HEP compliance overall.   States that she returned to work last week and has been able to manage her work duties with work accommodations for shortened work days/hours.  Notes some  increased swelling and discomfort from standing for prolonged periods but no instability present.      2025: Patient reports          Recurrent probem    Quality of life: good    Patient Goals  Patient goals for therapy: increased strength, independence with ADLs/IADLs, return to sport/leisure activities, increased motion and decreased pain    Pain  Current pain ratin  At best pain ratin  At worst pain ratin  Quality: dull ache and tight  Relieving factors: rest, relaxation, ice and medications (Tramadol for pain management)  Aggravating factors: standing and walking (rest at night)    Social Support  Steps to enter house: yes (2 w/ railing)  Stairs in house: no   Lives in: one-story house  Lives with: significant other    Employment status: working ([employer])    Diagnostic Tests  MRI studies: abnormal        Objective     Observations     Additional Observation Details  (+) 1+ pitting edema (RESOLVED but joint effusion remains present)    (+) high risk for DVT per Wells Criteria (RESOLVED)      Neurological Testing     Sensation     Knee   Left Knee   Intact: Light touch    Active Range of Motion   Left Knee   Flexion: 130 degrees WFL  Extension: 0 degrees WFL    Passive Range of Motion   Left Knee   Flexion: 130 degrees WFL  Extension: 0 degrees WFL    Mobility   Patellar Mobility:   Left Knee   WFL: medial, lateral, superior and inferior.     Strength/Myotome Testing     Left Knee   Flexion: 4  Extension: 4  Quadriceps contraction: fair    Right Knee   Flexion: 5  Extension: 5  Quadriceps contraction: good    Additional Strength Details  Hip abduction MMT: 4- on L, 4 on R    L hamstring make testin/5  L quad make testin/5    Tests     Additional Tests Details  Single leg hop test:   R = 69cm  L = Unable; leg gave out  SLS: > 30 seconds    Ambulation   Weight-Bearing Status     Additional Weight-Bearing Status Details  Gait assessment: Normal gait mechanics and gait speed without an  "AD    Stair navigation: Moderate contralateral vault ascending and eccentric loss while descending steps while using a rail given occasional buckling    5XSTS: 9.70 seconds without UE push off             Precautions:   Patient Active Problem List   Diagnosis    Internal derangement of left knee    GERD (gastroesophageal reflux disease)    Hypertension    PONV (postoperative nausea and vomiting)    Tear of medial meniscus of left knee, unspecified tear type, unspecified whether old or current tear, initial encounter    Status post medial meniscus repair    Greater trochanteric bursitis of left hip     TTWB x4 weeks  ROM as tolerated  Visits 1x/wk per High copay      Manuals 3/26        3/5 3/12   L knee patellar mobs             L knee PROM             L knee scar mobs             Re-eval 15'            Neuro Re-Ed             Sharpened romberg on foam 4x20\" B/L            SLS on foam         3x20\" 3x20\"   SLS w ball catch - self toss on foam             Single leg foam step ups 2x10 x2\"        2x10 x2\" 2x10 x2\"   Rockerboard M/L stability         2x10 mini squats BOSU 2x10 mini squats BOSU   Single leg y             Ther Ex             Recumbent bike ROM Upright L2 5'        Upright L2 5' Upright L2 5'   Quad sets             Heel slides w/ strap             SLR in supine             LAQ -  7.5# 3x10        7.5# 3x10 7.5# 3x10   Leg Press - 90 deg knee bend - single leg 135# 3x10 double leg        65# 3x10 65# 3x10   Squats to chair             Standing gastroc stretch             Standing heel and toe raises 2x10 single leg        3x10 single leg 3x10 single leg   Seated HS curls with TB - green NV        3x10 3x10   HS curls on chair             Step ups/down - 6\"             Wall squats             Ther Activity             Gait training             Step ups 6\"             Lifting mechanics review for work sim. NV        25# 2x10 25# 2x10   Dependent transfers             Modalities                               "

## 2025-06-12 ENCOUNTER — OFFICE VISIT (OUTPATIENT)
Dept: PHYSICAL THERAPY | Facility: CLINIC | Age: 60
End: 2025-06-12
Payer: COMMERCIAL

## 2025-06-12 DIAGNOSIS — S83.242A TEAR OF MEDIAL MENISCUS OF LEFT KNEE, UNSPECIFIED TEAR TYPE, UNSPECIFIED WHETHER OLD OR CURRENT TEAR, INITIAL ENCOUNTER: ICD-10-CM

## 2025-06-12 DIAGNOSIS — M25.562 ACUTE PAIN OF LEFT KNEE: Primary | ICD-10-CM

## 2025-06-12 PROCEDURE — 97112 NEUROMUSCULAR REEDUCATION: CPT

## 2025-06-12 PROCEDURE — 97110 THERAPEUTIC EXERCISES: CPT

## 2025-06-12 NOTE — PROGRESS NOTES
"Daily Note     Today's date: 2025  Patient name: Pricilla Norman  : 1965  MRN: [id number]  Referring provider: Lambert Hunt PA*  Dx:   Encounter Diagnosis     ICD-10-CM    1. Acute pain of left knee  M25.562       2. Tear of medial meniscus of left knee, unspecified tear type, unspecified whether old or current tear, initial encounter  S83.242A                      Subjective: Patient missed last visit due to not feeling well. Has been feeling better in regards to her L knee with some pain only when she is on her feet a long time at work. Her swelling comes and goes but as of now it is minimal.      Objective: See treatment diary below.      Assessment: Tolerated treatment well. Added more SL balance to work on more stability through LE chain. Unable to balance in SL for more than 5-10 seconds. Some swelling in the anteromedial aspect of knee. Continued to utilize the equipment in the facility to gain more functional strength. Would continue skilled PT to address deficits and increase function.      Plan: Continue per plan of care.          Precautions:   Patient Active Problem List   Diagnosis    Internal derangement of left knee    GERD (gastroesophageal reflux disease)    Hypertension    PONV (postoperative nausea and vomiting)    Tear of medial meniscus of left knee, unspecified tear type, unspecified whether old or current tear, initial encounter    Status post medial meniscus repair    Greater trochanteric bursitis of left hip       ROM as tolerated  Visits 1x/wk per High copay    Manuals 3/26 4/2 4/16 4/22 5 5/7 5/15 5/20 5/29 6/12   L knee patellar mobs     PROM - EH        L knee PROM     EH        L knee scar mobs             Re-eval 15'            L knee assessment      8'       L knee photobiomodulation      15W 3' 15 W - EH 15W  -YOAN 15W  -YOAN 15W  - YOAN                Neuro Re-Ed             Sharpened romberg on foam 4x20\" B/L            SLS on foam             SLS w ball catch - " "self toss on foam  2x10 2x10 2x10         Single leg foam step ups 2x10 x2\" 2x10 x2\" 2x10 x2\" 2\" hold,  2x10         Rockerboard M/L stability  w/ perturbations  W/ perturbations 1'x3 With perturbations    1 min  x2         Single leg y             SL on BOSU          15\"x3   Squats on BOSU          2x10                Ther Ex             Recumbent bike ROM Upright L2 5' Upright L2 5' Upright L2 5' Upright  5 min  L2  L1 5' 5 min  L1 5 min  L1 5 min L1 5 min L1   Quad sets             Heel slides w/ strap             SLR in supine             LAQ -  7.5# 3x10 7.5# 3x10           Leg Press - 90 deg knee bend - single leg 135# 3x10 double leg 145# double leg  3x10 145# double leg 3x10 Double leg     145#   3x10 Double leg 145# 3x10 Double leg 145# 3x10 Double  Leg    145#  3x10 Double leg    145#  3x10 Double leg  145#  3x10 Double leg  145#  3x10   Squats to chair       Holding YMB  2kg    3x5 Holding  YMB  2kg    3x10 Holding YMB  2kg    3x10    Standing gastroc stretch             Standing heel and toe raises 2x10 single leg 3x10 single leg 3x10 single leg  Single leg     3x10  Single leg 3x10  Single leg     3x10 Single leg    3x10 Slingle leg raise    3x10    Seated HS curls with TB - green NV            HS curls on chair  20 ft. x6 20ft. x6  20 ft   x6         Step ups/down - 6\"             Wall squats             Junior lateral stepping     7.0   x5   bilat  10# x5 B/L  10.5   x5   bilat 10.5#x5  bilat 10.5#x5  bilat 11# x 5  bilat   Sled push and pull      50ft x2 ea.  50 ft   x2 ea 25ft x4ea 50ftx2  ea 50ft x2  ea   Deadlift with ball tap        2x10 3x10 3x10                Ther Activity             Gait training             Step ups 6\"             Lifting mechanics review for work sim. NV 30# x10           Dependent transfers                          Modalities             Indirect low level laser - knee post-op protocol 10W     EH                          "

## 2025-06-18 ENCOUNTER — OFFICE VISIT (OUTPATIENT)
Dept: PHYSICAL THERAPY | Facility: CLINIC | Age: 60
End: 2025-06-18
Payer: COMMERCIAL

## 2025-06-18 DIAGNOSIS — M25.562 ACUTE PAIN OF LEFT KNEE: Primary | ICD-10-CM

## 2025-06-18 DIAGNOSIS — S83.242A TEAR OF MEDIAL MENISCUS OF LEFT KNEE, UNSPECIFIED TEAR TYPE, UNSPECIFIED WHETHER OLD OR CURRENT TEAR, INITIAL ENCOUNTER: ICD-10-CM

## 2025-06-18 PROCEDURE — 97110 THERAPEUTIC EXERCISES: CPT | Performed by: PHYSICAL THERAPIST

## 2025-06-18 PROCEDURE — 97140 MANUAL THERAPY 1/> REGIONS: CPT | Performed by: PHYSICAL THERAPIST

## 2025-06-18 NOTE — PROGRESS NOTES
PT Re-Evaluation  and PT Discharge    Today's date: 2025  Patient name: Pricilla Norman  : 1965  MRN: [id number]  Referring provider: Lambert Hunt PA*  Dx:   Encounter Diagnosis     ICD-10-CM    1. Acute pain of left knee  M25.562       2. Tear of medial meniscus of left knee, unspecified tear type, unspecified whether old or current tear, initial encounter  S83.242A                        Assessment  Impairments: abnormal gait, abnormal or restricted ROM, abnormal movement, activity intolerance, impaired balance, impaired physical strength, lacks appropriate home exercise program, pain with function and weight-bearing intolerance    Assessment details: Patient is a 59 y.o. year old female who attended physical therapy for 29 treatment sessions s/p left knee arthroscopic repair regarding a complex tear posterior horn/root of the medial meniscus that was repaired and a complex tear of the posterior horn of the lateral meniscus on 10/10/2024. Patient reports significant improvement at this time which correlates with FOTO scoring.  Patient has shown improvement throughout PT by demonstrating decreased pain, increased range of motion, increased strength, improved tolerance to activity, and improved gait/balance.  Secondary to achieving functional goals and independence with comprehensive Home Exercise Program, Pricilla Kay will be discharged from PT at this time.  Thank you.      Understanding of Dx/Px/POC: excellent     Prognosis: excellent    Goals  STG (6 weeks)  1. Patient will be independent with initial HEP (MET)  2. Decrease pain at worst by 2 points on NPRS (MET)  3. Increase left knee PROM to full and pain free (MET)  4. Patient will demonstrate ability to ambulate with normal gait mechanics without AD (MET)  LTG (4-6 months)  1. Decrease pain at worst from 4 points on NPRS (MET  2. Increase single hop to within 85% of contralateral limb for resumption of dynamic hiking activities (NOT  MET)  3. Increase left quad/hamstring strength equal to the right per MMT (PARTIALLY MET)  4. Patient will demonstrate ability to perform dynamic SLS without pain or LOB (PARTIALLY MET)  5. Increase FOTO score > or equal to expected outcome (PROGRESSING)      Plan  Patient would benefit from: skilled PT    Planned therapy interventions: joint mobilization, manual therapy, patient education, neuromuscular re-education, strengthening, stretching, therapeutic activities, therapeutic exercise, transfer training, home exercise program, functional ROM exercises, balance/weight bearing training and ADL training    Treatment plan discussed with: patient        Subjective Evaluation    History of Present Illness  Mechanism of injury: Patient reports to outpatient PT s/p arthroscopic repair regarding a left knee complex tear posterior horn/root of the medial meniscus that was REPAIRED and a complex tear of the posterior horn of the lateral meniscus that was debrided on 10/10/2024.  Dr. Nur educated patient on early mobilization and will be TTWB for 2-4 weeks.  Post-operatively, patient describes the pain as achy and sharp which is worse with movement of the leg and improved with rest and ice.  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  Patients goals for PT are to decrease the pain and return to PLOF.      1/15/2025: Patient is now 14 weeks post-operative.  States that she has been unable to resume normal work duties as a result of continued pain, swelling and instability.  The knee was drained of fluid last week and patient does note improved mobility with less discomfort since.  Notes that her greatest impairments include weakness and instability preventing her from performing her normal job duties.      3/26/2025: Patient reports HEP compliance overall.   States that she returned to work last week and has  been able to manage her work duties with work accommodations for shortened work days/hours.  Notes some increased swelling and discomfort from standing for prolonged periods but no instability present.      2025: Patient reports reduced overall pain and decreasing instability but notes that on occasion it will still buckle.  Patient has returned to work and is lifting okay but notes some discomfort with prolonged standing/walking on her 12 hour days.            Recurrent probem    Quality of life: good    Patient Goals  Patient goals for therapy: increased strength, independence with ADLs/IADLs, return to sport/leisure activities, increased motion and decreased pain    Pain  Current pain ratin  At best pain ratin  At worst pain rating: 3  Quality: dull ache and tight  Relieving factors: rest, relaxation, ice and medications  Aggravating factors: standing and walking (rest at night)    Social Support  Steps to enter house: yes (2 w/ railing)  Stairs in house: no   Lives in: one-story house  Lives with: significant other    Employment status: working ([employer])    Diagnostic Tests  MRI studies: abnormal        Objective     Observations     Additional Observation Details  (+) 1+ pitting edema (RESOLVED but joint effusion remains present)    (+) high risk for DVT per Wells Criteria (RESOLVED)      Neurological Testing     Sensation     Knee   Left Knee   Intact: Light touch    Active Range of Motion   Left Knee   Flexion: 130 degrees WFL  Extension: 3 degrees WFL    Passive Range of Motion   Left Knee   Flexion: 130 degrees WFL  Extension: 0 degrees WFL    Mobility   Patellar Mobility:   Left Knee   WFL: medial, lateral, superior and inferior.     Strength/Myotome Testing     Left Knee   Flexion: 4  Extension: 4  Quadriceps contraction: good    Right Knee   Flexion: 5  Extension: 5  Quadriceps contraction: good    Additional Strength Details  Hip abduction MMT: 4- on L, 4 on R    L hamstring make testing:  "4/  L quad make testin    Ambulation   Weight-Bearing Status     Additional Weight-Bearing Status Details  Gait assessment: Normal gait mechanics and gait speed without an AD    Stair navigation: Moderate contralateral vault ascending and eccentric loss while descending steps while using a rail given occasional buckling    5XSTS: 9.70 seconds without UE push off             Precautions:   Patient Active Problem List   Diagnosis    Internal derangement of left knee    GERD (gastroesophageal reflux disease)    Hypertension    PONV (postoperative nausea and vomiting)    Tear of medial meniscus of left knee, unspecified tear type, unspecified whether old or current tear, initial encounter    Status post medial meniscus repair    Greater trochanteric bursitis of left hip       ROM as tolerated  Visits 1x/wk per High copay    Manuals 6/18     5/7 5/15 5/20 5/29 6/12   L knee patellar mobs             L knee PROM             L knee scar mobs             Re-eval 25'            L knee assessment      8'       L knee photobiomodulation      15W 3' 15 W - EH 15W  -YOAN 15W  -YOAN 15W  - YOAN                Neuro Re-Ed             Sharpened romberg on foam             SLS on foam             SLS w ball catch - self toss on foam             Single leg foam step ups             Rockerboard M/L stability             Single leg y             SL on BOSU          15\"x3   Squats on BOSU          2x10                Ther Ex             Recumbent bike ROM L3 5'     L1 5' 5 min  L1 5 min  L1 5 min L1 5 min L1   Quad sets             Heel slides w/ strap             SLR in supine             LAQ -              Leg Press - 90 deg knee bend - single leg      Double leg 145# 3x10 Double  Leg    145#  3x10 Double leg    145#  3x10 Double leg  145#  3x10 Double leg  145#  3x10   Squats to chair       Holding YMB  2kg    3x5 Holding  YMB  2kg    3x10 Holding YMB  2kg    3x10    Standing gastroc stretch             Standing heel and toe raises  " "    Single leg 3x10  Single leg     3x10 Single leg    3x10 Slingle leg raise    3x10    Seated HS curls with TB - green             HS curls on chair             Step ups/down - 6\"             Wall squats             Junior lateral stepping      10# x5 B/L  10.5   x5   bilat 10.5#x5  bilat 10.5#x5  bilat 11# x 5  bilat   Sled push and pull      50ft x2 ea.  50 ft   x2 ea 25ft x4ea 50ftx2  ea 50ft x2  ea   Deadlift with ball tap        2x10 3x10 3x10   HEP Review 10'            Ther Activity             Gait training             Step ups 6\"             Lifting mechanics review for work sim.             Dependent transfers                          Modalities             Indirect low level laser - knee post-op protocol 10W                                      "

## 2025-06-25 ENCOUNTER — APPOINTMENT (OUTPATIENT)
Dept: PHYSICAL THERAPY | Facility: CLINIC | Age: 60
End: 2025-06-25
Payer: COMMERCIAL

## 2025-07-27 DIAGNOSIS — Z98.890 STATUS POST MEDIAL MENISCUS REPAIR: ICD-10-CM

## 2025-07-28 RX ORDER — CELECOXIB 100 MG/1
100 CAPSULE ORAL 2 TIMES DAILY
Qty: 60 CAPSULE | Refills: 1 | Status: SHIPPED | OUTPATIENT
Start: 2025-07-28

## (undated) DEVICE — OCCLUSIVE GAUZE STRIP,3% BISMUTH TRIBROMOPHENATE IN PETROLATUM BLEND: Brand: XEROFORM

## (undated) DEVICE — 4-PORT MANIFOLD: Brand: NEPTUNE 2

## (undated) DEVICE — CHLORAPREP HI-LITE 26ML ORANGE

## (undated) DEVICE — IMPERVIOUS STOCKINETTE: Brand: DEROYAL

## (undated) DEVICE — TIBURON EXTREMITY SHEET: Brand: CONVERTORS

## (undated) DEVICE — 3M™ STERI-STRIP™ REINFORCED ADHESIVE SKIN CLOSURES, R1547, 1/2 IN X 4 IN (12 MM X 100 MM), 6 STRIPS/ENVELOPE: Brand: 3M™ STERI-STRIP™

## (undated) DEVICE — DISPOSABLE OR TOWEL: Brand: CARDINAL HEALTH

## (undated) DEVICE — ACE WRAP 6 IN UNSTERILE

## (undated) DEVICE — WEBRIL 6 IN UNSTERILE

## (undated) DEVICE — CUFF TOURNIQUET 30 X 4 IN QUICK CONNECT DISP 1BLA

## (undated) DEVICE — SURGICAL GOWN, XL SMARTSLEEVE: Brand: CONVERTORS

## (undated) DEVICE — PADDING CAST 6IN COTTON STRL

## (undated) DEVICE — COBAN 6 IN STERILE

## (undated) DEVICE — TUBING ARTHROSCOPIC WAVE  MAIN PUMP

## (undated) DEVICE — GLOVE INDICATOR PI UNDERGLOVE SZ 8 BLUE

## (undated) DEVICE — LIGHT GLOVE GREEN

## (undated) DEVICE — SCD SEQUENTIAL COMPRESSION COMFORT SLEEVE MEDIUM KNEE LENGTH: Brand: KENDALL SCD

## (undated) DEVICE — BLADE SHAVER DISSECTOR 4MM 13CM COOLCUT

## (undated) DEVICE — GLOVE SRG BIOGEL 7.5

## (undated) DEVICE — DRAPE SHEET THREE QUARTER

## (undated) DEVICE — BETHLEHEM UNIVERSAL  ARTHRO PK: Brand: CARDINAL HEALTH

## (undated) DEVICE — 3M™ STERI-DRAPE™ U-DRAPE 1015: Brand: STERI-DRAPE™

## (undated) DEVICE — INTENDED FOR TISSUE SEPARATION, AND OTHER PROCEDURES THAT REQUIRE A SHARP SURGICAL BLADE TO PUNCTURE OR CUT.: Brand: BARD-PARKER ® CARBON RIB-BACK BLADES

## (undated) DEVICE — ARTHROSCOPY FLOOR MAT

## (undated) DEVICE — KNOT PUSHER/SUTURE CUTTER W/ PORTAL SKID BUNDLE

## (undated) DEVICE — ABDOMINAL PAD: Brand: DERMACEA

## (undated) DEVICE — SUT ETHILON 3-0 PS-1 18 IN 1663G